# Patient Record
Sex: MALE | HISPANIC OR LATINO | Employment: OTHER | URBAN - METROPOLITAN AREA
[De-identification: names, ages, dates, MRNs, and addresses within clinical notes are randomized per-mention and may not be internally consistent; named-entity substitution may affect disease eponyms.]

---

## 2018-03-30 ENCOUNTER — HOSPITAL ENCOUNTER (OUTPATIENT)
Dept: RADIOLOGY | Facility: MEDICAL CENTER | Age: 83
End: 2018-03-30
Attending: FAMILY MEDICINE
Payer: MEDICARE

## 2018-03-30 ENCOUNTER — OFFICE VISIT (OUTPATIENT)
Dept: MEDICAL GROUP | Facility: MEDICAL CENTER | Age: 83
End: 2018-03-30
Payer: MEDICARE

## 2018-03-30 VITALS
TEMPERATURE: 96.8 F | WEIGHT: 153 LBS | OXYGEN SATURATION: 96 % | HEART RATE: 72 BPM | BODY MASS INDEX: 24.59 KG/M2 | DIASTOLIC BLOOD PRESSURE: 60 MMHG | SYSTOLIC BLOOD PRESSURE: 120 MMHG | HEIGHT: 66 IN | RESPIRATION RATE: 16 BRPM

## 2018-03-30 DIAGNOSIS — R68.89 FLU-LIKE SYMPTOMS: ICD-10-CM

## 2018-03-30 DIAGNOSIS — J18.9 PNEUMONIA OF LEFT LOWER LOBE DUE TO INFECTIOUS ORGANISM: ICD-10-CM

## 2018-03-30 LAB
FLUAV+FLUBV AG SPEC QL IA: NEGATIVE
INT CON NEG: NEGATIVE
INT CON POS: POSITIVE

## 2018-03-30 PROCEDURE — 87804 INFLUENZA ASSAY W/OPTIC: CPT | Performed by: FAMILY MEDICINE

## 2018-03-30 PROCEDURE — 71046 X-RAY EXAM CHEST 2 VIEWS: CPT

## 2018-03-30 PROCEDURE — 99214 OFFICE O/P EST MOD 30 MIN: CPT | Performed by: FAMILY MEDICINE

## 2018-03-30 RX ORDER — BENZONATATE 100 MG/1
100 CAPSULE ORAL 3 TIMES DAILY PRN
Qty: 60 CAP | Refills: 0 | Status: SHIPPED | OUTPATIENT
Start: 2018-03-30 | End: 2022-01-01

## 2018-03-30 RX ORDER — LEVOFLOXACIN 750 MG/1
750 TABLET, FILM COATED ORAL DAILY
Qty: 5 TAB | Refills: 0 | Status: SHIPPED | OUTPATIENT
Start: 2018-03-30 | End: 2018-04-04

## 2018-03-30 RX ORDER — ALBUTEROL SULFATE 90 UG/1
2 AEROSOL, METERED RESPIRATORY (INHALATION) EVERY 6 HOURS PRN
Qty: 8.5 G | Refills: 0 | Status: SHIPPED | OUTPATIENT
Start: 2018-03-30 | End: 2022-01-01

## 2018-03-30 RX ORDER — PREDNISONE 20 MG/1
20 TABLET ORAL DAILY
Qty: 5 TAB | Refills: 0 | Status: SHIPPED | OUTPATIENT
Start: 2018-03-30 | End: 2018-04-04

## 2018-03-30 NOTE — PROGRESS NOTES
cc: Cough    Subjective:     Jesse Conway is a 82 y.o. male presenting with his daughter is interpreting into [employer]. They reported that he developed a cough approximately one week ago. Associated with body aches, chills, subjective fevers, sore throat, runny nose, mild abdominal pain, shortness of breath, poor appetite. He had similar symptoms about 3 weeks ago, but symptoms initially resolved for about a week before his current episode started. He has been feeling incredibly weak and tired. He is currently using a cane, and has never needed one in the past. He reports falling twice in the shower due to weakness. He hasn't staying well-hydrated. Denies any nausea, vomiting, ear pain, diarrhea, rashes, recent travel, sick contacts. He has not had a sleep shot today. He has an allergy to penicillin, developed hives and shortness of breath.      Review of systems:  See above.        Current Outpatient Prescriptions:   •  levoFLOXacin (LEVAQUIN) 750 MG tablet, Take 1 Tab by mouth every day for 5 days., Disp: 5 Tab, Rfl: 0  •  predniSONE (DELTASONE) 20 MG Tab, Take 1 Tab by mouth every day for 5 days., Disp: 5 Tab, Rfl: 0  •  albuterol 108 (90 Base) MCG/ACT Aero Soln inhalation aerosol, Inhale 2 Puffs by mouth every 6 hours as needed for Shortness of Breath., Disp: 8.5 g, Rfl: 0  •  benzonatate (TESSALON) 100 MG Cap, Take 1 Cap by mouth 3 times a day as needed for Cough., Disp: 60 Cap, Rfl: 0    Allergies   Allergen Reactions   • Pcn [Penicillins]        Past Medical History:   Diagnosis Date   • Arthritis    • CATARACT    • Depression 8/3/2012   • DJD (degenerative joint disease) 4/22/2010   • GERD (gastroesophageal reflux disease) 8/3/2012   • H. pylori infection 8/3/2012   • Heart attack 2001   • Hypertension    • Impaired fasting glucose 8/3/2012     Past Surgical History:   Procedure Laterality Date   • CATARACT EXTRACTION WITH IOL       Family History   Problem Relation Age of Onset   • Diabetes Mother   "  • Hyperlipidemia       hyperlipidemia   • Hyperlipidemia Mother      Social History     Social History   • Marital status:      Spouse name: N/A   • Number of children: N/A   • Years of education: N/A     Occupational History   • Not on file.     Social History Main Topics   • Smoking status: Former Smoker     Quit date: 7/20/2012   • Smokeless tobacco: Never Used   • Alcohol use Yes      Comment: occasionally   • Drug use: No   • Sexual activity: Yes     Partners: Female      Comment:      Other Topics Concern   • Not on file     Social History Narrative   • No narrative on file       Objective:     Vitals: /60   Pulse 72   Temp 36 °C (96.8 °F)   Resp 16   Ht 1.676 m (5' 6\")   Wt 69.4 kg (153 lb)   SpO2 96%   BMI 24.69 kg/m²   General: Alert, pleasant, NAD, fatigued  HEENT: Normocephalic.  PERRL, EOMI, no icterus or pallor.  Conjunctivae and lids normal. External ears normal.  Tympanic membranes pearly, opaque.  Nares patent, mucosa pink, drainage present.  Oropharynx non-erythematous, mucous membranes moist.  Neck supple.  No thyromegaly or masses palpated. No cervical or supraclavicular lymphadenopathy.  Heart: Regular rate and rhythm.  S1 and S2 normal.  No murmurs appreciated.  Respiratory: Normal respiratory effort.  Diffuse rales on the left.  Abdomen: Non-distended, soft  Skin: Warm, dry, no rashes.  Extremities: No leg edema  Psych:  Affect/mood is normal, judgement is good, memory is intact, grooming is appropriate.    poc influenza: negative    Assessment/Plan:     Jesse was seen today for extremity weakness.    Diagnoses and all orders for this visit:    Pneumonia of left lower lobe due to infectious organism (CMS-[facility])  Flu-like symptoms  Discussed that he has pneumonia given his symptoms and exam. We'll start treatment with Levaquin, prednisone, albuterol and Tessalon if needed. X-ray ordered to confirm.  Discussed reasons to go to the ER, advised a very low threshold for " taking him to the ER given his weakness. Advised plenty of rest, fluids, Tylenol if needed. Recommended that he follow-up in a couple weeks  -     DX-CHEST-2 VIEWS; Future  -     POCT Influenza A/B  -     levoFLOXacin (LEVAQUIN) 750 MG tablet; Take 1 Tab by mouth every day for 5 days.  -     predniSONE (DELTASONE) 20 MG Tab; Take 1 Tab by mouth every day for 5 days.  -     albuterol 108 (90 Base) MCG/ACT Aero Soln inhalation aerosol; Inhale 2 Puffs by mouth every 6 hours as needed for Shortness of Breath.  -     benzonatate (TESSALON) 100 MG Cap; Take 1 Cap by mouth 3 times a day as needed for Cough.        Return in about 4 weeks (around 4/27/2018) for routine follow up.

## 2019-02-12 ENCOUNTER — OFFICE VISIT (OUTPATIENT)
Dept: INTERNAL MEDICINE | Facility: MEDICAL CENTER | Age: 84
End: 2019-02-12
Payer: MEDICARE

## 2019-02-12 VITALS
SYSTOLIC BLOOD PRESSURE: 132 MMHG | HEIGHT: 63 IN | OXYGEN SATURATION: 96 % | TEMPERATURE: 97.8 F | WEIGHT: 167.25 LBS | DIASTOLIC BLOOD PRESSURE: 52 MMHG | BODY MASS INDEX: 29.63 KG/M2 | HEART RATE: 67 BPM

## 2019-02-12 DIAGNOSIS — F32.9 MAJOR DEPRESSIVE DISORDER, REMISSION STATUS UNSPECIFIED, UNSPECIFIED WHETHER RECURRENT: ICD-10-CM

## 2019-02-12 DIAGNOSIS — Z86.69 HISTORY OF ITCHING OF EYE: ICD-10-CM

## 2019-02-12 DIAGNOSIS — I25.119 CORONARY ARTERY DISEASE WITH ANGINA PECTORIS, UNSPECIFIED VESSEL OR LESION TYPE, UNSPECIFIED WHETHER NATIVE OR TRANSPLANTED HEART (HCC): ICD-10-CM

## 2019-02-12 DIAGNOSIS — M46.92 CERVICAL SPONDYLITIS (HCC): ICD-10-CM

## 2019-02-12 DIAGNOSIS — R73.01 IFG (IMPAIRED FASTING GLUCOSE): ICD-10-CM

## 2019-02-12 DIAGNOSIS — H57.9 ITCHY, WATERY, AND RED EYE: ICD-10-CM

## 2019-02-12 DIAGNOSIS — Z91.89 AT RISK FOR PROLONGED QT INTERVAL SYNDROME: ICD-10-CM

## 2019-02-12 DIAGNOSIS — F45.8 OTHER SOMATOFORM DISORDERS: ICD-10-CM

## 2019-02-12 DIAGNOSIS — M79.601 RIGHT ARM PAIN: ICD-10-CM

## 2019-02-12 PROCEDURE — 99204 OFFICE O/P NEW MOD 45 MIN: CPT | Mod: GC | Performed by: INTERNAL MEDICINE

## 2019-02-12 PROCEDURE — 99999 PR NO CHARGE: CPT | Performed by: INTERNAL MEDICINE

## 2019-02-12 PROCEDURE — 93000 ELECTROCARDIOGRAM COMPLETE: CPT | Mod: GC | Performed by: INTERNAL MEDICINE

## 2019-02-12 ASSESSMENT — PATIENT HEALTH QUESTIONNAIRE - PHQ9: CLINICAL INTERPRETATION OF PHQ2 SCORE: 0

## 2019-02-12 NOTE — PATIENT INSTRUCTIONS
Eat healthy diet, avoid stresses, do regular exercise program  Get labs and x rays  Once those come back we will decide what to do for your right arm and mood  See the eye doctor. In the mean time apply warm compresses to your eyes and use Refresh preservative free

## 2019-02-12 NOTE — LETTER
[facility]  [facility]  [address]  [place] [address]  Fax: [phone]   Authorization for Release/Disclosure of   Protected Health Information   Name: JESSE LOFTON : 1935 SSN: [id number]   Address: [address]  [place] [address] Phone:    [phone] (home)    I authorize the entity listed below to release/disclose the PHI below to:   [facility]YSSA [facility] and JOCELYN [facility]nes[facility].   Provider or Entity Name:Dr Curtis      [facility], [facility]   Phone:[phone]      Fax:     Reason for request: continuity of care   Information to be released:    [  ] LAST COLONOSCOPY,  including any PATH REPORT and follow-up  [  ] LAST FIT/COLOGUARD RESULT [  ] LAST DEXA  [  ] LAST MAMMOGRAM  [  ] LAST PAP  [  ] LAST LABS [  ] RETINA EXAM REPORT  [  ] IMMUNIZATION RECORDS  [ X ] Release all info      [  ] Check here and initial the line next to each item to release ALL health information INCLUDING  _____ Care and treatment for drug and / or alcohol abuse  _____ HIV testing, infection status, or AIDS  _____ Genetic Testing    DATES OF SERVICE OR TIME PERIOD TO BE DISCLOSED: _____________  I understand and acknowledge that:  * This Authorization may be revoked at any time by you in writing, except if your health information has already been used or disclosed.  * Your health information that will be used or disclosed as a result of you signing this authorization could be re-disclosed by the recipient. If this occurs, your re-disclosed health information may no longer be protected by State or Federal laws.  * You may refuse to sign this Authorization. Your refusal will not affect your ability to obtain treatment.  * This Authorization becomes effective upon signing and will  on (date) __________.      If no date is indicated, this Authorization will  one (1) year from the signature date.    Name: Jesse Lofton    Signature:   Date:     2/12/2019       PLEASE FAX REQUESTED RECORDS BACK TO: [phone]

## 2019-02-13 NOTE — PROGRESS NOTES
New Patient to Establish    Reason to establish: Establish care with PCP    Chief Complaint   Patient presents with   • New Patient   • Fatigue     Weak   • Arm Pain     Right. Elbow down to finger tips.    • Eye Problem     Itching/red/dry        History has taken from the patient (interpretation service), his daughter and medical records    HPI:  83 year old male with PMH of CVA, cataract, CAD. He come today to establish care with PCP. The patient come today with his daughter (Kellen Ventura). He does not speak english so we use interpretation services ([place], [id number]).     On today's visit he is c/o b/l eye itching that stated right after his cataract surgery 5 year ago with on and off course. Itching is usually associated with mild watery discharge and red eye. The patient denies hx of allergic conjunctivitis or any other atopic conditions. However, he reports hx of allergy to penecillin    He also c/o right arm pain that extend from right elbow to the 4th and 5th fingers. The pain is about 9-10/10, aching, constant pain, not related to recent trauma and not associated with any weakness or  sensary loss. The patient couldn't estimate when the pain start but described it as long term pain.    The patient also report low mood most of times, less interest in activities he used to do and feeling tired all the time. He also reports sleeping much longer than he used to before. He denies PMH of similar condition as well hx of thyroid diseases. However, he denies any thought of harming himself or others.    The patient reports PMH of CAD. In 2000, the patient had chest pain for which he went to hospital he received medical management but did not receive LHC or further intervention. Since then he [place] not similar condition. He also report hx of a couple of high BP readings but he never being on any BP medication    The patient lives with his family in his home with his wife, 4 children and grandchildren. He received some  primary school education. He is retired. He is independent in all his ADLs like bathing, eating as well in most of his IADLs however, he get help in managing his financials, shopping and paying his bills. He stopped driving long time ago as his driving licensing has withdrawn from him because of Driving Under Alcohol Influence.    On today's visit the patient denies chest pain, palpitations, dizziness fever, cough, SOB, headaches, constipation, diarrhea, insistency urgency or bunring mitiuration. However he report mild low stable back pain.       FH:   The patient denies any family  Hx of atopic condition like allergic conjunctivitis or allergic rhinitis    SH:  The patient smoked cigarettes  For about 50 year but quit in 2004  His driving license has withdrown due to Driving Under Alcohol Influence.  The patient daughter reports that the patient was a heavy alcohol drinker but stated to cutdown after 2004 but does have 4 drinks or more in last year a couple of times.  Retired           THREE CHRONIC CONDITIONS  1- Coronary artery disease, stable  2- Osteoarthritis, stable  3- Allergic conjuctivitis, stable      Past Medical History:   Diagnosis Date   • Arthritis    • CATARACT    • Depression 8/3/2012   • DJD (degenerative joint disease) 4/22/2010   • GERD (gastroesophageal reflux disease) 8/3/2012   • H. pylori infection 8/3/2012   • Heart attack (HCC) 2001   • Hypertension    • Impaired fasting glucose 8/3/2012       Past Surgical History:   Procedure Laterality Date   • CATARACT EXTRACTION WITH IOL         Current Outpatient Prescriptions   Medication Sig Dispense Refill   • albuterol 108 (90 Base) MCG/ACT Aero Soln inhalation aerosol Inhale 2 Puffs by mouth every 6 hours as needed for Shortness of Breath. (Patient not taking: Reported on 2/12/2019) 8.5 g 0   • benzonatate (TESSALON) 100 MG Cap Take 1 Cap by mouth 3 times a day as needed for Cough. (Patient not taking: Reported on 2/12/2019) 60 Cap 0     No  "current facility-administered medications for this visit.        Allergies as of 02/12/2019 - Reviewed 02/12/2019   Allergen Reaction Noted   • Pcn [penicillins] Rash and Nausea 04/22/2010       Social History     Social History   • Marital status:      Spouse name: N/A   • Number of children: N/A   • Years of education: N/A     Occupational History   • Not on file.     Social History Main Topics   • Smoking status: Former Smoker     Packs/day: 0.50     Years: 50.00     Types: Cigarettes     Quit date: 01/2004   • Smokeless tobacco: Never Used   • Alcohol use Yes      Comment: occasionally.    • Drug use: No   • Sexual activity: Yes     Partners: Female      Comment:      Other Topics Concern   • Not on file     Social History Narrative   • No narrative on file       Family History   Problem Relation Age of Onset   • Diabetes Mother    • Hyperlipidemia Mother    • Hyperlipidemia Unknown         hyperlipidemia       ROS:   A complete 14-system review of systems was obtained and is otherwise negative except as stated in the history of present illness, below, and/or the pre-visit questionnaire.        /52 (BP Location: Right arm, Patient Position: Sitting, BP Cuff Size: Adult)   Pulse 67   Temp 36.6 °C (97.8 °F) (Temporal)   Ht 1.607 m (5' 3.25\")   Wt 75.9 kg (167 lb 4 oz)   SpO2 96%   BMI 29.39 kg/m²     Physical Exam  General:  Alert and oriented.  No apparent distress.  Eyes: Pupils equal and reactive to light. No scleral icterus.O/E: mild b/l eye redness, mild white to yellow discharge and significant lose of eye lashes especially in the both lower eyelids  Ears:  Ear canals patent. Tympanic membranes visualized.  ENMT: Moist mucous membranes. Oropharynx clear. No erythema or exudates noted.  Neck: Supple. Trachea midline.  Resp: Clear to auscultation bilaterally. No rales, rhonchi, or wheezes.  Cardiovascular: Regular rate and normal rhythm. No murmurs, rubs or gallops. 2+ radial and " dorsalis pedis pulses.   Abdomen: Soft, nontender, nondistended. Positive bowel sounds.   Musculoskeletal: No clubbing, cyanosis, edema.  pain in the right upper limb following ulnar nerve distribution. There is not swelling, loss of power or sensation.  Skin: Clear. No rash noted. Onychomycosis on lower limb nails   Lymph: No cervical lymphadenopathy appreciated.  Neuro: Cranial nerves II through XII intact.  Psych:  Mood most of times, less interest in activities he used to do and feeling tired all the time. He also reports sleeping more than he used to before      Assessment and Plan  # Evaluation for allergic conjunctivitis:  c/o b/l eye itching, stated right after cataract surgery 5 year ago,  associated with mild watery discharge and red eye, hx of allergy to penicillin. O/E: mild b/l eye redness, mild white to yellow discharge and significant lose of eye lashes especially in the both lower eyelids. We recommend the patient to use sterile Refresh preservative free eye drops and hot compresses. We also recommend a referral to opthalmology for further evaluation.    # Right arm pain for evaluation/Cervical spondylitis  C/O: right arm pain that extend from right elbow to the 4th and 5th fingers. 9-10/10, aching, constant pain, no recent trauma and not associated with any weakness or loss of sensation. O/E: the pain is following ulnar nerve distribution. There is not swelling, loss of power or sensation. 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 Pt's right arm pain likely related to cervical degenerative disease. We recommend cervical XRay to evaluate for disease progression as well right elbow x ray to exclude ulnar nerve compression. We'll continue to monitor    # Evaluation for Major Depression Disorder   C/o low mood most of times,  less interest in activities he used to do, feeling tired all the time., sleeping more than he used to before. We perform PHQ-9 today which showed significant 16/27 (moderate to sever depression). We recommend TSH, B12 level to exclude other causes of depression. We consider alcohol dependency as a possibly participating factor. We also consider starting the patient on SSRI. However, QTc today is 469. We will discuss risks and benefits with patient in the next visit    # Prolonged QT interval syndrome  QTc today is 469 ms and  signs of first degree heart block (2/12). Last EKC showed QTc 470 ms (7/5/2012). The patient denies chest pain, palpitations, syncope or pre-syncope. We counseled the patient to report long QTc before getting any new medication. We will continue to monitor    # Coronary artery disease:  The patient reports PMH of CAD. In 2000, the patient had chest pain for which he went to hospital he received medical management but did not receive LHC or further intervention. EKG today show relative bradycardia HR(57) with signs of first degree heart block. However, the patient denies chest pain or new exertional SOB. The patient has multiple risk factors for CAD, age, gender, previous Hx of smoking. The patient EKG changes likely related of Hx of CAD. We may consider stress test to exclude the need for intervention in the next visit    # Evaluation for alcohol dependency:  Driving license has withdrown due to Driving Under Alcohol Influence.. The patient daughter reports that the patient was a heavy alcohol drinker but stated to cutdown after 2004 but does have 4 drinks or more in last year at least one time. AST was 46, AST was 23 (8/2015), almost 2:1 ratio which indicate ongoing alcohol related liver disease. We counseled the patient on cutting down on drinking as it likey participating in low mood, EKC changes and elevated liver enzymes. We continue to monitor and may consider Alcoholics Anonymous in the  next visit.    # Glucose intolerance/ Pre-diabetes:  The patient has PMH HbA1c is 6 in 8/2015. The patient did not receive any medication. Today he denies polydipsia, polyphagia or polyurea We recommend to check HbA1c to assess to disease progression. We will continue to monitor          Geriatrics comprehensive assessment:  - Mood: reports  low mood  PHQ-9 today which showed significant 16/27 (moderate to sever depression). We recommend TSH, B12 level to exclude other causes of depression    - Functional level:  The patient independent in all his ADLs. He gets some help for IADLs like shopping and paying bills. Ambulate without assistance but use a      - Falls:  The patient reports 2 falls in the past year, last fall was in the summer of 2018 that was in the home backyard when the patient watering his plants.    - Memory:  Denies memory difficulties    - Hearing and vision:  Denies any hearing or vision issues    - Advance care planning and AD  The patient has not AD. We will consider open AD discussion next visit            Patient Instructions   Eat healthy diet, avoid stresses, do regular exercise program  Get labs and x rays  Once those come back we will decide what to do for your right arm and mood  See the eye doctor. In the mean time apply warm compresses to your eyes and use Refresh preservative free      Follow-up  Return in about 2 weeks (around 2/26/2019) for Long.    Signed by: Milena Savage M.D.

## 2019-03-09 LAB
ALBUMIN SERPL-MCNC: 3.9 G/DL (ref 3.5–4.7)
ALBUMIN/GLOB SERPL: 1.2 {RATIO} (ref 1.2–2.2)
ALP SERPL-CCNC: 84 IU/L (ref 39–117)
ALT SERPL-CCNC: 23 IU/L (ref 0–44)
AST SERPL-CCNC: 50 IU/L (ref 0–40)
BASOPHILS # BLD AUTO: 0.1 X10E3/UL (ref 0–0.2)
BASOPHILS NFR BLD AUTO: 1 %
BILIRUB SERPL-MCNC: 2.1 MG/DL (ref 0–1.2)
BUN SERPL-MCNC: 8 MG/DL (ref 8–27)
BUN/CREAT SERPL: 11 (ref 10–24)
CALCIUM SERPL-MCNC: 9.3 MG/DL (ref 8.6–10.2)
CHLORIDE SERPL-SCNC: 106 MMOL/L (ref 96–106)
CO2 SERPL-SCNC: 18 MMOL/L (ref 20–29)
CREAT SERPL-MCNC: 0.75 MG/DL (ref 0.76–1.27)
EOSINOPHIL # BLD AUTO: 0.2 X10E3/UL (ref 0–0.4)
EOSINOPHIL NFR BLD AUTO: 4 %
ERYTHROCYTE [DISTWIDTH] IN BLOOD BY AUTOMATED COUNT: 14.8 % (ref 12.3–15.4)
GLOBULIN SER CALC-MCNC: 3.2 G/DL (ref 1.5–4.5)
GLUCOSE SERPL-MCNC: 110 MG/DL (ref 65–99)
HCT VFR BLD AUTO: 47 % (ref 37.5–51)
HGB BLD-MCNC: 16.4 G/DL (ref 13–17.7)
IMM GRANULOCYTES # BLD AUTO: 0 X10E3/UL (ref 0–0.1)
IMM GRANULOCYTES NFR BLD AUTO: 0 %
IMMATURE CELLS  115398: ABNORMAL
LYMPHOCYTES # BLD AUTO: 2.3 X10E3/UL (ref 0.7–3.1)
LYMPHOCYTES NFR BLD AUTO: 34 %
MCH RBC QN AUTO: 33.1 PG (ref 26.6–33)
MCHC RBC AUTO-ENTMCNC: 34.9 G/DL (ref 31.5–35.7)
MCV RBC AUTO: 95 FL (ref 79–97)
MONOCYTES # BLD AUTO: 0.8 X10E3/UL (ref 0.1–0.9)
MONOCYTES NFR BLD AUTO: 11 %
MORPHOLOGY BLD-IMP: ABNORMAL
NEUTROPHILS # BLD AUTO: 3.5 X10E3/UL (ref 1.4–7)
NEUTROPHILS NFR BLD AUTO: 50 %
NRBC BLD AUTO-RTO: ABNORMAL %
PLATELET # BLD AUTO: 114 X10E3/UL (ref 150–379)
POTASSIUM SERPL-SCNC: 3.8 MMOL/L (ref 3.5–5.2)
PROT SERPL-MCNC: 7.1 G/DL (ref 6–8.5)
RBC # BLD AUTO: 4.96 X10E6/UL (ref 4.14–5.8)
SODIUM SERPL-SCNC: 139 MMOL/L (ref 134–144)
TSH SERPL DL<=0.005 MIU/L-ACNC: 3.12 UIU/ML (ref 0.45–4.5)
VIT B12 SERPL-MCNC: 608 PG/ML (ref 232–1245)
WBC # BLD AUTO: 6.9 X10E3/UL (ref 3.4–10.8)

## 2019-07-23 ENCOUNTER — OFFICE VISIT (OUTPATIENT)
Dept: MEDICAL GROUP | Facility: MEDICAL CENTER | Age: 84
End: 2019-07-23
Payer: MEDICARE

## 2019-07-23 VITALS
BODY MASS INDEX: 29.77 KG/M2 | HEART RATE: 87 BPM | HEIGHT: 63 IN | WEIGHT: 168 LBS | SYSTOLIC BLOOD PRESSURE: 148 MMHG | OXYGEN SATURATION: 94 % | TEMPERATURE: 99.6 F | DIASTOLIC BLOOD PRESSURE: 70 MMHG | RESPIRATION RATE: 16 BRPM

## 2019-07-23 DIAGNOSIS — R06.02 SOB (SHORTNESS OF BREATH) ON EXERTION: ICD-10-CM

## 2019-07-23 DIAGNOSIS — F33.1 MODERATE EPISODE OF RECURRENT MAJOR DEPRESSIVE DISORDER (HCC): ICD-10-CM

## 2019-07-23 DIAGNOSIS — R94.31 PROLONGED Q-T INTERVAL ON ECG: ICD-10-CM

## 2019-07-23 DIAGNOSIS — M79.89 LEG SWELLING: ICD-10-CM

## 2019-07-23 DIAGNOSIS — I25.10 CORONARY ARTERY DISEASE INVOLVING NATIVE HEART WITHOUT ANGINA PECTORIS, UNSPECIFIED VESSEL OR LESION TYPE: ICD-10-CM

## 2019-07-23 DIAGNOSIS — R53.83 TIREDNESS: ICD-10-CM

## 2019-07-23 PROCEDURE — 99204 OFFICE O/P NEW MOD 45 MIN: CPT | Performed by: FAMILY MEDICINE

## 2019-07-23 ASSESSMENT — PATIENT HEALTH QUESTIONNAIRE - PHQ9
1. LITTLE INTEREST OR PLEASURE IN DOING THINGS: 2
4. FEELING TIRED OR HAVING LITTLE ENERGY: 2
8. MOVING OR SPEAKING SO SLOWLY THAT OTHER PEOPLE COULD HAVE NOTICED. OR THE OPPOSITE, BEING SO FIGETY OR RESTLESS THAT YOU HAVE BEEN MOVING AROUND A LOT MORE THAN USUAL: 2
7. TROUBLE CONCENTRATING ON THINGS, SUCH AS READING THE NEWSPAPER OR WATCHING TELEVISION: 1
5. POOR APPETITE OR OVEREATING: 1
9. THOUGHTS THAT YOU WOULD BE BETTER OFF DEAD, OR OF HURTING YOURSELF: 0
SUM OF ALL RESPONSES TO PHQ9 QUESTIONS 1 AND 2: 4
2. FEELING DOWN, DEPRESSED, IRRITABLE, OR HOPELESS: 2
3. TROUBLE FALLING OR STAYING ASLEEP OR SLEEPING TOO MUCH: 1
6. FEELING BAD ABOUT YOURSELF - OR THAT YOU ARE A FAILURE OR HAVE LET YOURSELF OR YOUR FAMILY DOWN: 2
SUM OF ALL RESPONSES TO PHQ QUESTIONS 1-9: 13

## 2019-07-23 NOTE — PROGRESS NOTES
CC: New patient: Depression, prolonged QT interval, CAD, shortness of breath/leg swelling/tiredness    HPI:  Jesse presents today to establish new PCP.    Patient is Urdu-speaking came in today with his daughter to establish new PCP.  Had the following medical problems:    Moderate episode of recurrent major depressive disorder (HCC)  Patient has been feeling weak and depressed.  PHQ score is 13.  Patient used to take antidepressant, but he ran out.  Was seen by a previous PCP and was found that he has prolonged QT interval and advised not to take the antidepressant at that time.  However he denies any suicidal ideation.    Prolonged Q-T interval on ECG  Patient has been asymptomatic.  12 accidental finding EKG.    Coronary artery disease involving native heart without angina pectoris, unspecified vessel or lesion type  Patient has history of myocardial infarction(in [place]), in 2004.  Has been asymptomatic for many years.  But lately has been having some shortness of breath or leg swelling.  However currently denies any chest pain.    Leg swelling/shortness of breath/tiredness  He has history of MI, currently denies leg swelling and shortness of breath with exertion, also he has been feeling tired all the time.  No chest pain.  No echocardiogram in record.  Denies any dizziness, nausea, vomiting.    Will discuss vaccinations and other health maintenance issues next visit in 2 weeks    Patient Active Problem List    Diagnosis Date Noted   • Moderate episode of recurrent major depressive disorder (HCC) 07/23/2019   • Prolonged Q-T interval on ECG 07/23/2019   • Coronary artery disease involving native heart without angina pectoris 07/23/2019   • Leg swelling 07/23/2019       Current Outpatient Prescriptions   Medication Sig Dispense Refill   • albuterol 108 (90 Base) MCG/ACT Aero Soln inhalation aerosol Inhale 2 Puffs by mouth every 6 hours as needed for Shortness of Breath. (Patient not taking: Reported on  "2/12/2019) 8.5 g 0   • benzonatate (TESSALON) 100 MG Cap Take 1 Cap by mouth 3 times a day as needed for Cough. (Patient not taking: Reported on 2/12/2019) 60 Cap 0     No current facility-administered medications for this visit.          Allergies as of 07/23/2019 - Reviewed 02/14/2019   Allergen Reaction Noted   • Pcn [penicillins] Rash and Nausea 04/22/2010        Social History     Social History   • Marital status:      Spouse name: N/A   • Number of children: N/A   • Years of education: N/A     Occupational History   • Not on file.     Social History Main Topics   • Smoking status: Former Smoker     Packs/day: 0.50     Years: 50.00     Types: Cigarettes     Quit date: 01/2004   • Smokeless tobacco: Never Used   • Alcohol use Yes      Comment: occasionally.    • Drug use: No   • Sexual activity: Yes     Partners: Female      Comment:      Other Topics Concern   • Not on file     Social History Narrative   • No narrative on file       Family History   Problem Relation Age of Onset   • Diabetes Mother    • Hyperlipidemia Mother    • Hyperlipidemia Unknown         hyperlipidemia       Past Surgical History:   Procedure Laterality Date   • CATARACT EXTRACTION WITH IOL         ROS:  Denies any Headache, Blurred Vision, Confusion Chest pain,  Shortness of breath,  Abdominal pain, Changes of bowel or bladder, Lower ext edema, Fevers, Nights sweats, Weight Changes, Focal weakness or numbness.  All other systems are negative.    /70 (BP Location: Right arm, Patient Position: Sitting, BP Cuff Size: Adult)   Pulse 87   Temp 37.6 °C (99.6 °F) (Temporal)   Resp 16   Ht 1.6 m (5' 3\")   Wt 76.2 kg (168 lb)   SpO2 94%   BMI 29.76 kg/m²     Physical Exam:  Gen:         Alert and oriented, No apparent distress.  HEENT:   P[place]la, TM clear,  Oralpharynx no erythema or exudates.  Neck:       No Jugular venous distension, Lymphadenopathy, Thyromegaly, Bruits.  Lungs:     Clear to auscultation " bilaterally  CV:          Regular rate and rhythm. No murmurs, rubs or gallops.  Abd:         Soft non tender, non distended. Normal active bowel sounds. No                                        Hepatosplenomegaly, No pulsatile masses.  Ext:          No clubbing, cyanosis, edema.    Assessment and Plan.   84 y.o. male     1. Moderate episode of recurrent major depressive disorder (HCC)  Patient used to take antidepressant, but he ran out.   At this moment I recommend not to take the antidepressant, previous EKG showed prolonged QT/QTc interval.    - TSH+FREE T4    2. Prolonged Q-T interval on ECG  Asymptomatic.  Patient advised to avoid medications that makes the problem worse to avoid cardiac arrhythmia    3. Coronary artery disease involving native heart without angina pectoris, unspecified vessel or lesion type  History of myocardial infarction(in [place]), in 2004.  Has been asymptomatic for many years.  But lately has been having some shortness of breath or leg swelling.  We will do echocardiogram.    - EC-ECHOCARDIOGRAM COMPLETE W/ CONT; Future  - CBC WITH DIFFERENTIAL; Future  - Comp Metabolic Panel; Future  - Lipid Profile; Future    4. Leg swelling  History of MI, currently denies leg swelling and shortness of breath with exertion.    - EC-ECHOCARDIOGRAM COMPLETE W/ CONT; Future    5. SOB (shortness of breath) on exertion    - EC-ECHOCARDIOGRAM COMPLETE W/ CONT; Future  - CBC WITH DIFFERENTIAL; Future    6. Tiredness  Rule out:  CHF: Patient has history of MI, currently has shortness of breath with exertion and bilateral leg swelling.  Anemia: Shortness of breath with exertion  Hypo-/hyper thyroidism: We will check TSH/free T4  Of the reason:e.g kidney disease, will do CMP  Patient is advised to return back in 2 weeks for reevaluation after taking his blood work and echocardiogram.    - EC-ECHOCARDIOGRAM COMPLETE W/ CONT; Future  - CBC WITH DIFFERENTIAL; Future  - Comp Metabolic Panel; Future  - TSH+FREE  T4

## 2019-08-09 LAB
ALBUMIN SERPL-MCNC: 3.6 G/DL (ref 3.5–4.7)
ALBUMIN/GLOB SERPL: 1.2 {RATIO} (ref 1.2–2.2)
ALP SERPL-CCNC: 92 IU/L (ref 39–117)
ALT SERPL-CCNC: 18 IU/L (ref 0–44)
AST SERPL-CCNC: 47 IU/L (ref 0–40)
BASOPHILS # BLD AUTO: 0.1 X10E3/UL (ref 0–0.2)
BASOPHILS NFR BLD AUTO: 1 %
BILIRUB SERPL-MCNC: 1.5 MG/DL (ref 0–1.2)
BUN SERPL-MCNC: 8 MG/DL (ref 8–27)
BUN/CREAT SERPL: 12 (ref 10–24)
CALCIUM SERPL-MCNC: 8.6 MG/DL (ref 8.6–10.2)
CHLORIDE SERPL-SCNC: 108 MMOL/L (ref 96–106)
CHOLEST SERPL-MCNC: 133 MG/DL (ref 100–199)
CO2 SERPL-SCNC: 18 MMOL/L (ref 20–29)
CREAT SERPL-MCNC: 0.69 MG/DL (ref 0.76–1.27)
EOSINOPHIL # BLD AUTO: 0.2 X10E3/UL (ref 0–0.4)
EOSINOPHIL NFR BLD AUTO: 4 %
ERYTHROCYTE [DISTWIDTH] IN BLOOD BY AUTOMATED COUNT: 14.3 % (ref 12.3–15.4)
GLOBULIN SER CALC-MCNC: 2.9 G/DL (ref 1.5–4.5)
GLUCOSE SERPL-MCNC: 127 MG/DL (ref 65–99)
HCT VFR BLD AUTO: 40.5 % (ref 37.5–51)
HDLC SERPL-MCNC: 52 MG/DL
HGB BLD-MCNC: 14.4 G/DL (ref 13–17.7)
IMM GRANULOCYTES # BLD AUTO: 0 X10E3/UL (ref 0–0.1)
IMM GRANULOCYTES NFR BLD AUTO: 0 %
IMMATURE CELLS  115398: ABNORMAL
LABORATORY COMMENT REPORT: NORMAL
LDLC SERPL CALC-MCNC: 55 MG/DL (ref 0–99)
LYMPHOCYTES # BLD AUTO: 2 X10E3/UL (ref 0.7–3.1)
LYMPHOCYTES NFR BLD AUTO: 37 %
MCH RBC QN AUTO: 33.6 PG (ref 26.6–33)
MCHC RBC AUTO-ENTMCNC: 35.6 G/DL (ref 31.5–35.7)
MCV RBC AUTO: 95 FL (ref 79–97)
MONOCYTES # BLD AUTO: 0.6 X10E3/UL (ref 0.1–0.9)
MONOCYTES NFR BLD AUTO: 11 %
MORPHOLOGY BLD-IMP: ABNORMAL
NEUTROPHILS # BLD AUTO: 2.5 X10E3/UL (ref 1.4–7)
NEUTROPHILS NFR BLD AUTO: 47 %
NRBC BLD AUTO-RTO: ABNORMAL %
PLATELET # BLD AUTO: 112 X10E3/UL (ref 150–450)
POTASSIUM SERPL-SCNC: 3.9 MMOL/L (ref 3.5–5.2)
PROT SERPL-MCNC: 6.5 G/DL (ref 6–8.5)
RBC # BLD AUTO: 4.28 X10E6/UL (ref 4.14–5.8)
SODIUM SERPL-SCNC: 139 MMOL/L (ref 134–144)
TRIGL SERPL-MCNC: 129 MG/DL (ref 0–149)
VLDLC SERPL CALC-MCNC: 26 MG/DL (ref 5–40)
WBC # BLD AUTO: 5.4 X10E3/UL (ref 3.4–10.8)

## 2019-08-12 ENCOUNTER — HOSPITAL ENCOUNTER (OUTPATIENT)
Dept: CARDIOLOGY | Facility: MEDICAL CENTER | Age: 84
End: 2019-08-12
Attending: FAMILY MEDICINE
Payer: MEDICARE

## 2019-08-12 DIAGNOSIS — I25.10 CORONARY ARTERY DISEASE INVOLVING NATIVE HEART WITHOUT ANGINA PECTORIS, UNSPECIFIED VESSEL OR LESION TYPE: ICD-10-CM

## 2019-08-12 DIAGNOSIS — R53.83 TIREDNESS: ICD-10-CM

## 2019-08-12 DIAGNOSIS — R06.02 SOB (SHORTNESS OF BREATH) ON EXERTION: ICD-10-CM

## 2019-08-12 DIAGNOSIS — M79.89 LEG SWELLING: ICD-10-CM

## 2019-08-12 LAB
LV EJECT FRACT  99904: 70
LV EJECT FRACT MOD 2C 99903: 64.65
LV EJECT FRACT MOD 4C 99902: 74.68
LV EJECT FRACT MOD BP 99901: 72.8

## 2019-08-12 PROCEDURE — 93306 TTE W/DOPPLER COMPLETE: CPT | Mod: 26 | Performed by: INTERNAL MEDICINE

## 2019-08-12 PROCEDURE — 93306 TTE W/DOPPLER COMPLETE: CPT

## 2019-08-21 ENCOUNTER — OFFICE VISIT (OUTPATIENT)
Dept: MEDICAL GROUP | Facility: MEDICAL CENTER | Age: 84
End: 2019-08-21
Payer: MEDICARE

## 2019-08-21 VITALS
HEART RATE: 66 BPM | OXYGEN SATURATION: 94 % | WEIGHT: 166.23 LBS | SYSTOLIC BLOOD PRESSURE: 120 MMHG | HEIGHT: 63 IN | BODY MASS INDEX: 29.45 KG/M2 | DIASTOLIC BLOOD PRESSURE: 70 MMHG | TEMPERATURE: 98.6 F | RESPIRATION RATE: 16 BRPM

## 2019-08-21 DIAGNOSIS — M79.89 LEG SWELLING: ICD-10-CM

## 2019-08-21 PROCEDURE — 99214 OFFICE O/P EST MOD 30 MIN: CPT | Performed by: FAMILY MEDICINE

## 2019-08-21 RX ORDER — FUROSEMIDE 20 MG/1
20 TABLET ORAL 2 TIMES DAILY
Qty: 14 TAB | Refills: 0 | Status: SHIPPED | OUTPATIENT
Start: 2019-08-21 | End: 2019-08-28

## 2019-08-21 RX ORDER — POTASSIUM CHLORIDE 20 MEQ/1
20 TABLET, EXTENDED RELEASE ORAL DAILY
Qty: 7 TAB | Refills: 0 | Status: SHIPPED | OUTPATIENT
Start: 2019-08-21 | End: 2019-08-28

## 2019-08-21 NOTE — PROGRESS NOTES
"CC: Leg swelling, discuss the echocardiogram result    HPI:   Jesse presents today because he has been having bilateral leg swelling, and some shortness of breath with exertion, echocardiogram was done, showed:  Normal left ventricular size and systolic function.  Left ventricular ejection fraction is visually estimated to be 70%.  Normal diastolic function.  Normal right ventricular systolic function.  Mild mitral regurgitation.    Denies any chest pain, or palpitation.  No shortness of breath at rest, no paroxysmal nocturnal dyspnea.    Patient Active Problem List    Diagnosis Date Noted   • Moderate episode of recurrent major depressive disorder (HCC) 07/23/2019   • Prolonged Q-T interval on ECG 07/23/2019   • Coronary artery disease involving native heart without angina pectoris 07/23/2019   • Leg swelling 07/23/2019       Current Outpatient Medications   Medication Sig Dispense Refill   • CINNAMON PO Take  by mouth.     • furosemide (LASIX) 20 MG Tab Take 1 Tab by mouth 2 times a day for 7 days. 14 Tab 0   • potassium chloride SA (KDUR) 20 MEQ Tab CR Take 1 Tab by mouth every day for 7 days. 7 Tab 0   • albuterol 108 (90 Base) MCG/ACT Aero Soln inhalation aerosol Inhale 2 Puffs by mouth every 6 hours as needed for Shortness of Breath. (Patient not taking: Reported on 2/12/2019) 8.5 g 0   • benzonatate (TESSALON) 100 MG Cap Take 1 Cap by mouth 3 times a day as needed for Cough. (Patient not taking: Reported on 2/12/2019) 60 Cap 0     No current facility-administered medications for this visit.          Allergies as of 08/21/2019 - Reviewed 08/21/2019   Allergen Reaction Noted   • Pcn [penicillins] Rash and Nausea 04/22/2010        ROS: Denies any chest pain, Shortness of breath, Changes bowel or bladder, Lower extremity edema.    Physical Exam:  /70 (BP Location: Right arm, Patient Position: Sitting, BP Cuff Size: Adult)   Pulse 66   Temp 37 °C (98.6 °F) (Temporal)   Resp 16   Ht 1.6 m (5' 3\")   Wt " 75.4 kg (166 lb 3.6 oz)   SpO2 94%   BMI 29.45 kg/m²   Gen.: Well-developed, well-nourished, no apparent distress,pleasant and cooperative with the examination  Neck:  No JVD.  Cor: Regular rate and rhythm without murmur, gallop or rub.  Extremities: Leg edema 2+        Assessment and Plan.   84 y.o. male     1. Leg swelling  Echocardiogram showed no significant abnormality, is probably due to venous insufficiency.  Will start patient on furosemide 20 mg twice a day for a week.  Patient advised to use compression socks after that.    - furosemide (LASIX) 20 MG Tab; Take 1 Tab by mouth 2 times a day for 7 days.  Dispense: 14 Tab; Refill: 0  - potassium chloride SA (KDUR) 20 MEQ Tab CR; Take 1 Tab by mouth every day for 7 days.  Dispense: 7 Tab; Refill: 0

## 2019-09-23 ENCOUNTER — OFFICE VISIT (OUTPATIENT)
Dept: URGENT CARE | Facility: PHYSICIAN GROUP | Age: 84
End: 2019-09-23
Payer: MEDICARE

## 2019-09-23 VITALS
HEIGHT: 64 IN | DIASTOLIC BLOOD PRESSURE: 62 MMHG | SYSTOLIC BLOOD PRESSURE: 142 MMHG | WEIGHT: 164 LBS | HEART RATE: 87 BPM | RESPIRATION RATE: 16 BRPM | TEMPERATURE: 99.2 F | OXYGEN SATURATION: 95 % | BODY MASS INDEX: 28 KG/M2

## 2019-09-23 DIAGNOSIS — H10.32 ACUTE BACTERIAL CONJUNCTIVITIS OF LEFT EYE: ICD-10-CM

## 2019-09-23 PROCEDURE — 99214 OFFICE O/P EST MOD 30 MIN: CPT | Performed by: FAMILY MEDICINE

## 2019-09-23 RX ORDER — TOBRAMYCIN 3 MG/ML
1 SOLUTION/ DROPS OPHTHALMIC EVERY 4 HOURS
Qty: 1 BOTTLE | Refills: 0 | Status: SHIPPED | OUTPATIENT
Start: 2019-09-23 | End: 2022-01-01

## 2019-09-24 NOTE — PROGRESS NOTES
Subjective:     Jesse Conway is a 84 y.o. male who presents for Eye Problem (Slight Drainage/ Itchy/ Vision Problem/Something inside x 2 days)       Eye Problem    The left eye is affected. This is a new problem. The current episode started in the past 7 days. The problem occurs constantly. The problem has been gradually worsening. There was no injury mechanism. The pain is moderate. Associated symptoms include an eye discharge, eye redness and itching. Pertinent negatives include no fever, foreign body sensation, nausea or vomiting.     Past Medical History:   Diagnosis Date   • Arthritis    • CATARACT    • Depression 8/3/2012   • DJD (degenerative joint disease) 4/22/2010   • GERD (gastroesophageal reflux disease) 8/3/2012   • H. pylori infection 8/3/2012   • Heart attack (HCC) 2001   • Hypertension    • Impaired fasting glucose 8/3/2012     Past Surgical History:   Procedure Laterality Date   • CATARACT EXTRACTION WITH IOL       Social History     Socioeconomic History   • Marital status:      Spouse name: Not on file   • Number of children: Not on file   • Years of education: Not on file   • Highest education level: Not on file   Occupational History   • Not on file   Social Needs   • Financial resource strain: Not on file   • Food insecurity:     Worry: Not on file     Inability: Not on file   • Transportation needs:     Medical: Not on file     Non-medical: Not on file   Tobacco Use   • Smoking status: Former Smoker     Packs/day: 0.50     Years: 50.00     Pack years: 25.00     Types: Cigarettes     Last attempt to quit: 01/2004     Years since quitting: 15.7   • Smokeless tobacco: Never Used   Substance and Sexual Activity   • Alcohol use: Not Currently     Comment: occasionally.    • Drug use: No   • Sexual activity: Yes     Partners: Female     Comment:    Lifestyle   • Physical activity:     Days per week: Not on file     Minutes per session: Not on file   • Stress: Not on file  "  Relationships   • Social connections:     Talks on phone: Not on file     Gets together: Not on file     Attends [employer] service: Not on file     Active member of club or organization: Not on file     Attends meetings of clubs or organizations: Not on file     Relationship status: Not on file   • Intimate partner violence:     Fear of current or ex partner: Not on file     Emotionally abused: Not on file     Physically abused: Not on file     Forced sexual activity: Not on file   Other Topics Concern   • Not on file   Social History Narrative   • Not on file      Family History   Problem Relation Age of Onset   • Diabetes Mother    • Hyperlipidemia Mother    • Hyperlipidemia Other         hyperlipidemia   • Stroke Neg Hx    • Heart Disease Neg Hx     Review of Systems   Constitutional: Negative for chills and fever.   HENT: Negative for sore throat.    Eyes: Positive for discharge, redness and itching. Negative for pain.   Respiratory: Negative for shortness of breath.    Cardiovascular: Negative for chest pain.   Gastrointestinal: Negative for nausea and vomiting.   Genitourinary: Negative for hematuria.   Musculoskeletal: Negative for myalgias.   Skin: Negative for rash.   Neurological: Negative for dizziness.     Allergies   Allergen Reactions   • Pcn [Penicillins] Rash and Nausea     .      Objective:   /62 (BP Location: Left arm, Patient Position: Sitting, BP Cuff Size: Adult)   Pulse 87   Temp 37.3 °C (99.2 °F) (Temporal)   Resp 16   Ht 1.626 m (5' 4\")   Wt 74.4 kg (164 lb)   SpO2 95%   BMI 28.15 kg/m²   Physical Exam   Constitutional: He is oriented to person, place, and time. He appears well-developed and well-nourished. No distress.   HENT:   Head: Normocephalic and atraumatic.   Eyes: Pupils are equal, round, and reactive to light. EOM are normal. Left eye exhibits discharge. Left conjunctiva is injected. Left conjunctiva has no hemorrhage.   Cardiovascular: Normal rate and regular rhythm. "   No murmur heard.  Pulmonary/Chest: Effort normal and breath sounds normal. No respiratory distress.   Abdominal: Soft. He exhibits no distension. There is no tenderness.   Neurological: He is alert and oriented to person, place, and time. He has normal reflexes. No sensory deficit.   Skin: Skin is warm and dry.   Psychiatric: He has a normal mood and affect.         Assessment/Plan:   Assessment    1. Acute bacterial conjunctivitis of left eye  - tobramycin (TOBREX) 0.3 % Solution ophthalmic solution; Place 1 Drop in both eyes every 4 hours.  Dispense: 1 Bottle; Refill: 0    Differential diagnosis, natural history, supportive care, and indications for immediate follow-up discussed.

## 2019-09-26 ASSESSMENT — ENCOUNTER SYMPTOMS
MYALGIAS: 0
FEVER: 0
SORE THROAT: 0
DIZZINESS: 0
SHORTNESS OF BREATH: 0
FOREIGN BODY SENSATION: 0
VOMITING: 0
EYE PAIN: 0
EYE ITCHING: 1
EYE REDNESS: 1
EYE DISCHARGE: 1
NAUSEA: 0
CHILLS: 0

## 2021-01-11 DIAGNOSIS — Z23 NEED FOR VACCINATION: ICD-10-CM

## 2022-01-01 ENCOUNTER — HOME CARE VISIT (OUTPATIENT)
Dept: HOSPICE | Facility: HOSPICE | Age: 87
End: 2022-01-01
Payer: MEDICARE

## 2022-01-01 ENCOUNTER — APPOINTMENT (OUTPATIENT)
Dept: RADIOLOGY | Facility: MEDICAL CENTER | Age: 87
DRG: 085 | End: 2022-01-01
Attending: EMERGENCY MEDICINE
Payer: MEDICARE

## 2022-01-01 ENCOUNTER — HOSPITAL ENCOUNTER (INPATIENT)
Facility: MEDICAL CENTER | Age: 87
LOS: 3 days | DRG: 085 | End: 2022-10-06
Attending: EMERGENCY MEDICINE | Admitting: INTERNAL MEDICINE
Payer: MEDICARE

## 2022-01-01 ENCOUNTER — APPOINTMENT (OUTPATIENT)
Dept: RADIOLOGY | Facility: MEDICAL CENTER | Age: 87
DRG: 086 | End: 2022-01-01
Attending: EMERGENCY MEDICINE
Payer: MEDICARE

## 2022-01-01 ENCOUNTER — APPOINTMENT (OUTPATIENT)
Dept: RADIOLOGY | Facility: MEDICAL CENTER | Age: 87
DRG: 086 | End: 2022-01-01
Attending: HOSPITALIST
Payer: MEDICARE

## 2022-01-01 ENCOUNTER — PHARMACY VISIT (OUTPATIENT)
Dept: PHARMACY | Facility: MEDICAL CENTER | Age: 87
End: 2022-01-01
Payer: COMMERCIAL

## 2022-01-01 ENCOUNTER — HOSPICE ADMISSION (OUTPATIENT)
Dept: HOSPICE | Facility: HOSPICE | Age: 87
End: 2022-01-01
Payer: MEDICARE

## 2022-01-01 ENCOUNTER — APPOINTMENT (OUTPATIENT)
Dept: RADIOLOGY | Facility: MEDICAL CENTER | Age: 87
DRG: 086 | End: 2022-01-01
Payer: MEDICARE

## 2022-01-01 ENCOUNTER — APPOINTMENT (OUTPATIENT)
Dept: RADIOLOGY | Facility: MEDICAL CENTER | Age: 87
DRG: 085 | End: 2022-01-01
Attending: INTERNAL MEDICINE
Payer: MEDICARE

## 2022-01-01 ENCOUNTER — HOME CARE VISIT (OUTPATIENT)
Dept: HOSPICE | Facility: HOSPICE | Age: 87
End: 2022-01-01

## 2022-01-01 ENCOUNTER — HOSPITAL ENCOUNTER (INPATIENT)
Facility: MEDICAL CENTER | Age: 87
LOS: 3 days | DRG: 086 | End: 2022-09-28
Attending: EMERGENCY MEDICINE | Admitting: SURGERY
Payer: MEDICARE

## 2022-01-01 ENCOUNTER — HOSPITAL ENCOUNTER (OUTPATIENT)
Dept: RADIOLOGY | Facility: MEDICAL CENTER | Age: 87
End: 2022-09-26
Attending: SURGERY
Payer: MEDICARE

## 2022-01-01 ENCOUNTER — APPOINTMENT (OUTPATIENT)
Dept: RADIOLOGY | Facility: MEDICAL CENTER | Age: 87
DRG: 086 | End: 2022-01-01
Attending: SURGERY
Payer: MEDICARE

## 2022-01-01 VITALS — HEART RATE: 80 BPM | RESPIRATION RATE: 12 BRPM | DIASTOLIC BLOOD PRESSURE: 60 MMHG | SYSTOLIC BLOOD PRESSURE: 100 MMHG

## 2022-01-01 VITALS — SYSTOLIC BLOOD PRESSURE: 140 MMHG | DIASTOLIC BLOOD PRESSURE: 70 MMHG | RESPIRATION RATE: 12 BRPM | HEART RATE: 80 BPM

## 2022-01-01 VITALS — RESPIRATION RATE: 11 BRPM | SYSTOLIC BLOOD PRESSURE: 106 MMHG | HEART RATE: 80 BPM | DIASTOLIC BLOOD PRESSURE: 60 MMHG

## 2022-01-01 VITALS
HEART RATE: 63 BPM | RESPIRATION RATE: 17 BRPM | BODY MASS INDEX: 19.55 KG/M2 | TEMPERATURE: 97.7 F | HEIGHT: 69 IN | OXYGEN SATURATION: 96 % | WEIGHT: 132 LBS | SYSTOLIC BLOOD PRESSURE: 144 MMHG | DIASTOLIC BLOOD PRESSURE: 64 MMHG

## 2022-01-01 VITALS
OXYGEN SATURATION: 94 % | RESPIRATION RATE: 19 BRPM | DIASTOLIC BLOOD PRESSURE: 61 MMHG | WEIGHT: 132.06 LBS | SYSTOLIC BLOOD PRESSURE: 108 MMHG | BODY MASS INDEX: 22.55 KG/M2 | TEMPERATURE: 97.9 F | HEIGHT: 64 IN | HEART RATE: 66 BPM

## 2022-01-01 VITALS — RESPIRATION RATE: 14 BRPM

## 2022-01-01 DIAGNOSIS — S06.5XAA TRAUMATIC SUBDURAL HEMATOMA, INITIAL ENCOUNTER (HCC): ICD-10-CM

## 2022-01-01 DIAGNOSIS — S06.5XAA SUBDURAL HEMATOMA (HCC): ICD-10-CM

## 2022-01-01 DIAGNOSIS — N39.0 URINARY TRACT INFECTION WITH HEMATURIA, SITE UNSPECIFIED: ICD-10-CM

## 2022-01-01 DIAGNOSIS — K74.69 OTHER CIRRHOSIS OF LIVER (HCC): ICD-10-CM

## 2022-01-01 DIAGNOSIS — K72.91: ICD-10-CM

## 2022-01-01 DIAGNOSIS — I25.10 CORONARY ARTERY DISEASE INVOLVING NATIVE HEART WITHOUT ANGINA PECTORIS, UNSPECIFIED VESSEL OR LESION TYPE: ICD-10-CM

## 2022-01-01 DIAGNOSIS — R31.9 URINARY TRACT INFECTION WITH HEMATURIA, SITE UNSPECIFIED: ICD-10-CM

## 2022-01-01 DIAGNOSIS — K72.10 CHRONIC LIVER FAILURE WITHOUT HEPATIC COMA (HCC): ICD-10-CM

## 2022-01-01 DIAGNOSIS — R41.82 ALTERED MENTAL STATUS, UNSPECIFIED ALTERED MENTAL STATUS TYPE: ICD-10-CM

## 2022-01-01 DIAGNOSIS — I10 PRIMARY HYPERTENSION: ICD-10-CM

## 2022-01-01 LAB
ABO GROUP BLD: NORMAL
ALBUMIN SERPL BCP-MCNC: 2.1 G/DL (ref 3.2–4.9)
ALBUMIN SERPL BCP-MCNC: 2.1 G/DL (ref 3.2–4.9)
ALBUMIN SERPL BCP-MCNC: 2.2 G/DL (ref 3.2–4.9)
ALBUMIN SERPL BCP-MCNC: 2.3 G/DL (ref 3.2–4.9)
ALBUMIN SERPL BCP-MCNC: 2.4 G/DL (ref 3.2–4.9)
ALBUMIN SERPL BCP-MCNC: 2.5 G/DL (ref 3.2–4.9)
ALBUMIN/GLOB SERPL: 0.7 G/DL
ALBUMIN/GLOB SERPL: 0.8 G/DL
ALBUMIN/GLOB SERPL: 0.9 G/DL
ALP SERPL-CCNC: 127 U/L (ref 30–99)
ALP SERPL-CCNC: 131 U/L (ref 30–99)
ALP SERPL-CCNC: 136 U/L (ref 30–99)
ALP SERPL-CCNC: 151 U/L (ref 30–99)
ALP SERPL-CCNC: 152 U/L (ref 30–99)
ALP SERPL-CCNC: 165 U/L (ref 30–99)
ALT SERPL-CCNC: 20 U/L (ref 2–50)
ALT SERPL-CCNC: 21 U/L (ref 2–50)
ALT SERPL-CCNC: 22 U/L (ref 2–50)
ALT SERPL-CCNC: 24 U/L (ref 2–50)
ALT SERPL-CCNC: 27 U/L (ref 2–50)
ALT SERPL-CCNC: 27 U/L (ref 2–50)
AMMONIA PLAS-SCNC: 236 UMOL/L (ref 11–45)
AMMONIA PLAS-SCNC: 51 UMOL/L (ref 11–45)
AMPHET UR QL SCN: NEGATIVE
ANION GAP SERPL CALC-SCNC: 10 MMOL/L (ref 7–16)
ANION GAP SERPL CALC-SCNC: 12 MMOL/L (ref 7–16)
ANION GAP SERPL CALC-SCNC: 5 MMOL/L (ref 7–16)
ANION GAP SERPL CALC-SCNC: 6 MMOL/L (ref 7–16)
ANION GAP SERPL CALC-SCNC: 8 MMOL/L (ref 7–16)
ANION GAP SERPL CALC-SCNC: 8 MMOL/L (ref 7–16)
APAP SERPL-MCNC: <5 UG/ML (ref 10–30)
APPEARANCE UR: ABNORMAL
APPEARANCE UR: ABNORMAL
AST SERPL-CCNC: 29 U/L (ref 12–45)
AST SERPL-CCNC: 30 U/L (ref 12–45)
AST SERPL-CCNC: 41 U/L (ref 12–45)
AST SERPL-CCNC: 42 U/L (ref 12–45)
AST SERPL-CCNC: 49 U/L (ref 12–45)
AST SERPL-CCNC: 58 U/L (ref 12–45)
BACTERIA #/AREA URNS HPF: ABNORMAL /HPF
BACTERIA #/AREA URNS HPF: ABNORMAL /HPF
BACTERIA BLD CULT: NORMAL
BACTERIA BLD CULT: NORMAL
BACTERIA UR CULT: ABNORMAL
BACTERIA UR CULT: ABNORMAL
BARBITURATES UR QL SCN: NEGATIVE
BARCODED ABORH UBTYP: 6200
BARCODED ABORH UBTYP: 8400
BARCODED ABORH UBTYP: 8400
BARCODED PRD CODE UBPRD: NORMAL
BARCODED UNIT NUM UBUNT: NORMAL
BASE EXCESS BLDV CALC-SCNC: -1 MMOL/L
BASOPHILS # BLD AUTO: 0.2 % (ref 0–1.8)
BASOPHILS # BLD AUTO: 0.2 % (ref 0–1.8)
BASOPHILS # BLD AUTO: 0.3 % (ref 0–1.8)
BASOPHILS # BLD AUTO: 0.7 % (ref 0–1.8)
BASOPHILS # BLD AUTO: 0.9 % (ref 0–1.8)
BASOPHILS # BLD AUTO: 1.3 % (ref 0–1.8)
BASOPHILS # BLD: 0.01 K/UL (ref 0–0.12)
BASOPHILS # BLD: 0.01 K/UL (ref 0–0.12)
BASOPHILS # BLD: 0.02 K/UL (ref 0–0.12)
BASOPHILS # BLD: 0.03 K/UL (ref 0–0.12)
BASOPHILS # BLD: 0.03 K/UL (ref 0–0.12)
BASOPHILS # BLD: 0.06 K/UL (ref 0–0.12)
BENZODIAZ UR QL SCN: NEGATIVE
BILIRUB SERPL-MCNC: 2.5 MG/DL (ref 0.1–1.5)
BILIRUB SERPL-MCNC: 2.8 MG/DL (ref 0.1–1.5)
BILIRUB SERPL-MCNC: 2.9 MG/DL (ref 0.1–1.5)
BILIRUB SERPL-MCNC: 3.3 MG/DL (ref 0.1–1.5)
BILIRUB SERPL-MCNC: 3.6 MG/DL (ref 0.1–1.5)
BILIRUB SERPL-MCNC: 4.8 MG/DL (ref 0.1–1.5)
BILIRUB UR QL STRIP.AUTO: ABNORMAL
BILIRUB UR QL STRIP.AUTO: NEGATIVE
BODY TEMPERATURE: 37 CENTIGRADE
BUN SERPL-MCNC: 24 MG/DL (ref 8–22)
BUN SERPL-MCNC: 33 MG/DL (ref 8–22)
BUN SERPL-MCNC: 7 MG/DL (ref 8–22)
BUN SERPL-MCNC: 9 MG/DL (ref 8–22)
BZE UR QL SCN: NEGATIVE
CA-I SERPL-SCNC: 1.2 MMOL/L (ref 1.1–1.3)
CALCIUM SERPL-MCNC: 8.1 MG/DL (ref 8.5–10.5)
CALCIUM SERPL-MCNC: 8.2 MG/DL (ref 8.5–10.5)
CALCIUM SERPL-MCNC: 8.3 MG/DL (ref 8.5–10.5)
CALCIUM SERPL-MCNC: 8.5 MG/DL (ref 8.5–10.5)
CALCIUM SERPL-MCNC: 8.7 MG/DL (ref 8.5–10.5)
CALCIUM SERPL-MCNC: 8.7 MG/DL (ref 8.5–10.5)
CANNABINOIDS UR QL SCN: NEGATIVE
CAOX CRY #/AREA URNS HPF: ABNORMAL /HPF
CFT BLD TEG: 3.5 MIN (ref 4.6–9.1)
CFT BLD TEG: 5.2 MIN (ref 4.6–9.1)
CFT P HPASE BLD TEG: 3.7 MIN (ref 4.3–8.3)
CFT P HPASE BLD TEG: 5.2 MIN (ref 4.3–8.3)
CHLORIDE SERPL-SCNC: 106 MMOL/L (ref 96–112)
CHLORIDE SERPL-SCNC: 107 MMOL/L (ref 96–112)
CHLORIDE SERPL-SCNC: 107 MMOL/L (ref 96–112)
CHLORIDE SERPL-SCNC: 109 MMOL/L (ref 96–112)
CHLORIDE SERPL-SCNC: 110 MMOL/L (ref 96–112)
CHLORIDE SERPL-SCNC: 111 MMOL/L (ref 96–112)
CLOT ANGLE BLD TEG: 67 DEGREES (ref 63–78)
CLOT ANGLE BLD TEG: 71 DEGREES (ref 63–78)
CLOT LYSIS 30M P MA LENFR BLD TEG: 0 % (ref 0–2.6)
CO2 SERPL-SCNC: 19 MMOL/L (ref 20–33)
CO2 SERPL-SCNC: 21 MMOL/L (ref 20–33)
CO2 SERPL-SCNC: 22 MMOL/L (ref 20–33)
CO2 SERPL-SCNC: 22 MMOL/L (ref 20–33)
CO2 SERPL-SCNC: 24 MMOL/L (ref 20–33)
CO2 SERPL-SCNC: 24 MMOL/L (ref 20–33)
COLOR UR: YELLOW
COLOR UR: YELLOW
COMPONENT F 8504F: NORMAL
COMPONENT F 8504F: NORMAL
COMPONENT P 8504P: NORMAL
CREAT SERPL-MCNC: 0.25 MG/DL (ref 0.5–1.4)
CREAT SERPL-MCNC: 0.37 MG/DL (ref 0.5–1.4)
CREAT SERPL-MCNC: 0.46 MG/DL (ref 0.5–1.4)
CREAT SERPL-MCNC: 0.52 MG/DL (ref 0.5–1.4)
CREAT SERPL-MCNC: 0.62 MG/DL (ref 0.5–1.4)
CREAT SERPL-MCNC: 0.73 MG/DL (ref 0.5–1.4)
CT.EXTRINSIC BLD ROTEM: 1.8 MIN (ref 0.8–2.1)
CT.EXTRINSIC BLD ROTEM: 2.8 MIN (ref 0.8–2.1)
EKG IMPRESSION: NORMAL
EKG IMPRESSION: NORMAL
EOSINOPHIL # BLD AUTO: 0 K/UL (ref 0–0.51)
EOSINOPHIL # BLD AUTO: 0.13 K/UL (ref 0–0.51)
EOSINOPHIL # BLD AUTO: 0.13 K/UL (ref 0–0.51)
EOSINOPHIL # BLD AUTO: 0.18 K/UL (ref 0–0.51)
EOSINOPHIL NFR BLD: 0 % (ref 0–6.9)
EOSINOPHIL NFR BLD: 2.7 % (ref 0–6.9)
EOSINOPHIL NFR BLD: 3 % (ref 0–6.9)
EOSINOPHIL NFR BLD: 5.2 % (ref 0–6.9)
EPI CELLS #/AREA URNS HPF: NEGATIVE /HPF
EPI CELLS #/AREA URNS HPF: NEGATIVE /HPF
ERYTHROCYTE [DISTWIDTH] IN BLOOD BY AUTOMATED COUNT: 60.2 FL (ref 35.9–50)
ERYTHROCYTE [DISTWIDTH] IN BLOOD BY AUTOMATED COUNT: 61.2 FL (ref 35.9–50)
ERYTHROCYTE [DISTWIDTH] IN BLOOD BY AUTOMATED COUNT: 63.3 FL (ref 35.9–50)
ERYTHROCYTE [DISTWIDTH] IN BLOOD BY AUTOMATED COUNT: 64.3 FL (ref 35.9–50)
EST. AVERAGE GLUCOSE BLD GHB EST-MCNC: 108 MG/DL
ETHANOL BLD-MCNC: <10.1 MG/DL
FLUAV RNA SPEC QL NAA+PROBE: NEGATIVE
FLUBV RNA SPEC QL NAA+PROBE: NEGATIVE
GFR SERPLBLD CREATININE-BSD FMLA CKD-EPI: 102 ML/MIN/1.73 M 2
GFR SERPLBLD CREATININE-BSD FMLA CKD-EPI: 109 ML/MIN/1.73 M 2
GFR SERPLBLD CREATININE-BSD FMLA CKD-EPI: 123 ML/MIN/1.73 M 2
GFR SERPLBLD CREATININE-BSD FMLA CKD-EPI: 89 ML/MIN/1.73 M 2
GFR SERPLBLD CREATININE-BSD FMLA CKD-EPI: 94 ML/MIN/1.73 M 2
GFR SERPLBLD CREATININE-BSD FMLA CKD-EPI: 99 ML/MIN/1.73 M 2
GLOBULIN SER CALC-MCNC: 2.7 G/DL (ref 1.9–3.5)
GLOBULIN SER CALC-MCNC: 2.8 G/DL (ref 1.9–3.5)
GLOBULIN SER CALC-MCNC: 3 G/DL (ref 1.9–3.5)
GLOBULIN SER CALC-MCNC: 3.2 G/DL (ref 1.9–3.5)
GLUCOSE BLD STRIP.AUTO-MCNC: 125 MG/DL (ref 65–99)
GLUCOSE BLD STRIP.AUTO-MCNC: 151 MG/DL (ref 65–99)
GLUCOSE SERPL-MCNC: 100 MG/DL (ref 65–99)
GLUCOSE SERPL-MCNC: 142 MG/DL (ref 65–99)
GLUCOSE SERPL-MCNC: 154 MG/DL (ref 65–99)
GLUCOSE SERPL-MCNC: 185 MG/DL (ref 65–99)
GLUCOSE SERPL-MCNC: 206 MG/DL (ref 65–99)
GLUCOSE SERPL-MCNC: 94 MG/DL (ref 65–99)
GLUCOSE UR STRIP.AUTO-MCNC: NEGATIVE MG/DL
GLUCOSE UR STRIP.AUTO-MCNC: NEGATIVE MG/DL
HBA1C MFR BLD: 5.4 % (ref 4–5.6)
HCO3 BLDV-SCNC: 22 MMOL/L (ref 24–28)
HCT VFR BLD AUTO: 29.5 % (ref 42–52)
HCT VFR BLD AUTO: 30.9 % (ref 42–52)
HCT VFR BLD AUTO: 33.9 % (ref 42–52)
HCT VFR BLD AUTO: 34 % (ref 42–52)
HCT VFR BLD AUTO: 36.6 % (ref 42–52)
HCT VFR BLD AUTO: 38.4 % (ref 42–52)
HGB BLD-MCNC: 10.2 G/DL (ref 14–18)
HGB BLD-MCNC: 10.7 G/DL (ref 14–18)
HGB BLD-MCNC: 11.3 G/DL (ref 14–18)
HGB BLD-MCNC: 11.9 G/DL (ref 14–18)
HGB BLD-MCNC: 12.8 G/DL (ref 14–18)
HGB BLD-MCNC: 13 G/DL (ref 14–18)
HYALINE CASTS #/AREA URNS LPF: ABNORMAL /LPF
IMM GRANULOCYTES # BLD AUTO: 0.01 K/UL (ref 0–0.11)
IMM GRANULOCYTES # BLD AUTO: 0.02 K/UL (ref 0–0.11)
IMM GRANULOCYTES # BLD AUTO: 0.05 K/UL (ref 0–0.11)
IMM GRANULOCYTES # BLD AUTO: 0.14 K/UL (ref 0–0.11)
IMM GRANULOCYTES NFR BLD AUTO: 0.3 % (ref 0–0.9)
IMM GRANULOCYTES NFR BLD AUTO: 0.4 % (ref 0–0.9)
IMM GRANULOCYTES NFR BLD AUTO: 0.5 % (ref 0–0.9)
IMM GRANULOCYTES NFR BLD AUTO: 0.6 % (ref 0–0.9)
IMM GRANULOCYTES NFR BLD AUTO: 0.9 % (ref 0–0.9)
IMM GRANULOCYTES NFR BLD AUTO: 1.1 % (ref 0–0.9)
INR PPP: 2.56 (ref 0.87–1.13)
KETONES UR STRIP.AUTO-MCNC: NEGATIVE MG/DL
KETONES UR STRIP.AUTO-MCNC: NEGATIVE MG/DL
LACTATE SERPL-SCNC: 2.6 MMOL/L (ref 0.5–2)
LEUKOCYTE ESTERASE UR QL STRIP.AUTO: ABNORMAL
LEUKOCYTE ESTERASE UR QL STRIP.AUTO: ABNORMAL
LIPASE SERPL-CCNC: 59 U/L (ref 11–82)
LYMPHOCYTES # BLD AUTO: 0.36 K/UL (ref 1–4.8)
LYMPHOCYTES # BLD AUTO: 0.58 K/UL (ref 1–4.8)
LYMPHOCYTES # BLD AUTO: 0.8 K/UL (ref 1–4.8)
LYMPHOCYTES # BLD AUTO: 1.25 K/UL (ref 1–4.8)
LYMPHOCYTES # BLD AUTO: 1.3 K/UL (ref 1–4.8)
LYMPHOCYTES # BLD AUTO: 1.7 K/UL (ref 1–4.8)
LYMPHOCYTES NFR BLD: 15.2 % (ref 22–41)
LYMPHOCYTES NFR BLD: 18.2 % (ref 22–41)
LYMPHOCYTES NFR BLD: 2.8 % (ref 22–41)
LYMPHOCYTES NFR BLD: 30.1 % (ref 22–41)
LYMPHOCYTES NFR BLD: 35.7 % (ref 22–41)
LYMPHOCYTES NFR BLD: 36.3 % (ref 22–41)
MAGNESIUM SERPL-MCNC: 1.5 MG/DL (ref 1.5–2.5)
MAGNESIUM SERPL-MCNC: 1.5 MG/DL (ref 1.5–2.5)
MAGNESIUM SERPL-MCNC: 1.9 MG/DL (ref 1.5–2.5)
MCF BLD TEG: 43.7 MM (ref 52–69)
MCF BLD TEG: <40 MM (ref 52–69)
MCF.PLATELET INHIB BLD ROTEM: 14.7 MM (ref 15–32)
MCF.PLATELET INHIB BLD ROTEM: 7.9 MM (ref 15–32)
MCH RBC QN AUTO: 34.7 PG (ref 27–33)
MCH RBC QN AUTO: 35.1 PG (ref 27–33)
MCH RBC QN AUTO: 35.3 PG (ref 27–33)
MCH RBC QN AUTO: 35.3 PG (ref 27–33)
MCH RBC QN AUTO: 35.4 PG (ref 27–33)
MCH RBC QN AUTO: 35.4 PG (ref 27–33)
MCHC RBC AUTO-ENTMCNC: 33.2 G/DL (ref 33.7–35.3)
MCHC RBC AUTO-ENTMCNC: 33.9 G/DL (ref 33.7–35.3)
MCHC RBC AUTO-ENTMCNC: 34.6 G/DL (ref 33.7–35.3)
MCHC RBC AUTO-ENTMCNC: 34.6 G/DL (ref 33.7–35.3)
MCHC RBC AUTO-ENTMCNC: 35 G/DL (ref 33.7–35.3)
MCHC RBC AUTO-ENTMCNC: 35.1 G/DL (ref 33.7–35.3)
MCV RBC AUTO: 100.3 FL (ref 81.4–97.8)
MCV RBC AUTO: 100.9 FL (ref 81.4–97.8)
MCV RBC AUTO: 102.1 FL (ref 81.4–97.8)
MCV RBC AUTO: 102.3 FL (ref 81.4–97.8)
MCV RBC AUTO: 102.4 FL (ref 81.4–97.8)
MCV RBC AUTO: 106.3 FL (ref 81.4–97.8)
METHADONE UR QL SCN: NEGATIVE
MICRO URNS: ABNORMAL
MICRO URNS: ABNORMAL
MONOCYTES # BLD AUTO: 0.06 K/UL (ref 0–0.85)
MONOCYTES # BLD AUTO: 0.33 K/UL (ref 0–0.85)
MONOCYTES # BLD AUTO: 0.5 K/UL (ref 0–0.85)
MONOCYTES # BLD AUTO: 0.54 K/UL (ref 0–0.85)
MONOCYTES # BLD AUTO: 0.55 K/UL (ref 0–0.85)
MONOCYTES # BLD AUTO: 1.04 K/UL (ref 0–0.85)
MONOCYTES NFR BLD AUTO: 1.9 % (ref 0–13.4)
MONOCYTES NFR BLD AUTO: 10.2 % (ref 0–13.4)
MONOCYTES NFR BLD AUTO: 10.5 % (ref 0–13.4)
MONOCYTES NFR BLD AUTO: 12.7 % (ref 0–13.4)
MONOCYTES NFR BLD AUTO: 8.2 % (ref 0–13.4)
MONOCYTES NFR BLD AUTO: 9.6 % (ref 0–13.4)
NEUTROPHILS # BLD AUTO: 1.64 K/UL (ref 1.82–7.42)
NEUTROPHILS # BLD AUTO: 11.19 K/UL (ref 1.82–7.42)
NEUTROPHILS # BLD AUTO: 2.29 K/UL (ref 1.82–7.42)
NEUTROPHILS # BLD AUTO: 2.35 K/UL (ref 1.82–7.42)
NEUTROPHILS # BLD AUTO: 2.52 K/UL (ref 1.82–7.42)
NEUTROPHILS # BLD AUTO: 3.87 K/UL (ref 1.82–7.42)
NEUTROPHILS NFR BLD: 47.7 % (ref 44–72)
NEUTROPHILS NFR BLD: 49.4 % (ref 44–72)
NEUTROPHILS NFR BLD: 53 % (ref 44–72)
NEUTROPHILS NFR BLD: 73.5 % (ref 44–72)
NEUTROPHILS NFR BLD: 79 % (ref 44–72)
NEUTROPHILS NFR BLD: 87.7 % (ref 44–72)
NITRITE UR QL STRIP.AUTO: NEGATIVE
NITRITE UR QL STRIP.AUTO: NEGATIVE
NRBC # BLD AUTO: 0 K/UL
NRBC BLD-RTO: 0 /100 WBC
OPIATES UR QL SCN: NEGATIVE
OXYCODONE UR QL SCN: NEGATIVE
PA AA BLD-ACNC: 35.6 % (ref 0–11)
PA AA BLD-ACNC: ABNORMAL % (ref 0–11)
PA ADP BLD-ACNC: 63.1 % (ref 0–17)
PA ADP BLD-ACNC: ABNORMAL % (ref 0–17)
PCO2 BLDV: 31.8 MMHG (ref 41–51)
PCP UR QL SCN: NEGATIVE
PH BLDV: 7.46 [PH] (ref 7.31–7.45)
PH UR STRIP.AUTO: 7 [PH] (ref 5–8)
PH UR STRIP.AUTO: 8.5 [PH] (ref 5–8)
PHOSPHATE SERPL-MCNC: 2.3 MG/DL (ref 2.5–4.5)
PHOSPHATE SERPL-MCNC: 2.4 MG/DL (ref 2.5–4.5)
PHOSPHATE SERPL-MCNC: 2.9 MG/DL (ref 2.5–4.5)
PLATELET # BLD AUTO: 67 K/UL (ref 164–446)
PLATELET # BLD AUTO: 68 K/UL (ref 164–446)
PLATELET # BLD AUTO: 75 K/UL (ref 164–446)
PLATELET # BLD AUTO: 76 K/UL (ref 164–446)
PLATELET # BLD AUTO: 77 K/UL (ref 164–446)
PLATELET # BLD AUTO: 84 K/UL (ref 164–446)
PMV BLD AUTO: 11.2 FL (ref 9–12.9)
PMV BLD AUTO: 11.2 FL (ref 9–12.9)
PMV BLD AUTO: 11.5 FL (ref 9–12.9)
PMV BLD AUTO: 11.7 FL (ref 9–12.9)
PMV BLD AUTO: 12.3 FL (ref 9–12.9)
PMV BLD AUTO: 12.4 FL (ref 9–12.9)
PO2 BLDV: 65.5 MMHG (ref 25–40)
POTASSIUM SERPL-SCNC: 3.6 MMOL/L (ref 3.6–5.5)
POTASSIUM SERPL-SCNC: 3.6 MMOL/L (ref 3.6–5.5)
POTASSIUM SERPL-SCNC: 4.1 MMOL/L (ref 3.6–5.5)
POTASSIUM SERPL-SCNC: 4.3 MMOL/L (ref 3.6–5.5)
POTASSIUM SERPL-SCNC: 4.5 MMOL/L (ref 3.6–5.5)
POTASSIUM SERPL-SCNC: 4.7 MMOL/L (ref 3.6–5.5)
PROCALCITONIN SERPL-MCNC: 0.05 NG/ML
PRODUCT TYPE UPROD: NORMAL
PROPOXYPH UR QL SCN: NEGATIVE
PROT SERPL-MCNC: 4.8 G/DL (ref 6–8.2)
PROT SERPL-MCNC: 4.9 G/DL (ref 6–8.2)
PROT SERPL-MCNC: 5.1 G/DL (ref 6–8.2)
PROT SERPL-MCNC: 5.7 G/DL (ref 6–8.2)
PROT UR QL STRIP: 100 MG/DL
PROT UR QL STRIP: NEGATIVE MG/DL
PROTHROMBIN TIME: 26.7 SEC (ref 12–14.6)
RBC # BLD AUTO: 2.89 M/UL (ref 4.7–6.1)
RBC # BLD AUTO: 3.02 M/UL (ref 4.7–6.1)
RBC # BLD AUTO: 3.2 M/UL (ref 4.7–6.1)
RBC # BLD AUTO: 3.36 M/UL (ref 4.7–6.1)
RBC # BLD AUTO: 3.65 M/UL (ref 4.7–6.1)
RBC # BLD AUTO: 3.75 M/UL (ref 4.7–6.1)
RBC # URNS HPF: ABNORMAL /HPF
RBC # URNS HPF: ABNORMAL /HPF
RBC UR QL AUTO: ABNORMAL
RBC UR QL AUTO: NEGATIVE
RH BLD: NORMAL
RSV RNA SPEC QL NAA+PROBE: NEGATIVE
SALICYLATES SERPL-MCNC: <1 MG/DL (ref 15–25)
SAO2 % BLDV: 92.7 %
SARS-COV-2 RNA RESP QL NAA+PROBE: NOTDETECTED
SIGNIFICANT IND 70042: ABNORMAL
SIGNIFICANT IND 70042: NORMAL
SIGNIFICANT IND 70042: NORMAL
SITE SITE: ABNORMAL
SITE SITE: NORMAL
SITE SITE: NORMAL
SODIUM SERPL-SCNC: 135 MMOL/L (ref 135–145)
SODIUM SERPL-SCNC: 137 MMOL/L (ref 135–145)
SODIUM SERPL-SCNC: 138 MMOL/L (ref 135–145)
SODIUM SERPL-SCNC: 139 MMOL/L (ref 135–145)
SODIUM SERPL-SCNC: 141 MMOL/L (ref 135–145)
SODIUM SERPL-SCNC: 141 MMOL/L (ref 135–145)
SOURCE SOURCE: ABNORMAL
SOURCE SOURCE: NORMAL
SOURCE SOURCE: NORMAL
SP GR UR STRIP.AUTO: 1.01
SP GR UR STRIP.AUTO: 1.02
SPECIMEN SOURCE: NORMAL
TEG ALGORITHM TGALG: ABNORMAL
TEG ALGORITHM TGALG: ABNORMAL
TRI-PHOS CRY #/AREA URNS HPF: ABNORMAL /HPF
TROPONIN T SERPL-MCNC: 13 NG/L (ref 6–19)
TROPONIN T SERPL-MCNC: 20 NG/L (ref 6–19)
TSH SERPL DL<=0.005 MIU/L-ACNC: 4.21 UIU/ML (ref 0.38–5.33)
UNIT STATUS USTAT: NORMAL
UROBILINOGEN UR STRIP.AUTO-MCNC: 2 MG/DL
UROBILINOGEN UR STRIP.AUTO-MCNC: >=8 MG/DL
WBC # BLD AUTO: 12.8 K/UL (ref 4.8–10.8)
WBC # BLD AUTO: 3.2 K/UL (ref 4.8–10.8)
WBC # BLD AUTO: 3.4 K/UL (ref 4.8–10.8)
WBC # BLD AUTO: 4.3 K/UL (ref 4.8–10.8)
WBC # BLD AUTO: 4.8 K/UL (ref 4.8–10.8)
WBC # BLD AUTO: 5.3 K/UL (ref 4.8–10.8)
WBC #/AREA URNS HPF: ABNORMAL /HPF
WBC #/AREA URNS HPF: ABNORMAL /HPF

## 2022-01-01 PROCEDURE — 84100 ASSAY OF PHOSPHORUS: CPT

## 2022-01-01 PROCEDURE — 700111 HCHG RX REV CODE 636 W/ 250 OVERRIDE (IP): Performed by: HOSPITALIST

## 2022-01-01 PROCEDURE — 770001 HCHG ROOM/CARE - MED/SURG/GYN PRIV*

## 2022-01-01 PROCEDURE — 700102 HCHG RX REV CODE 250 W/ 637 OVERRIDE(OP): Performed by: SURGERY

## 2022-01-01 PROCEDURE — 96365 THER/PROPH/DIAG IV INF INIT: CPT

## 2022-01-01 PROCEDURE — P9017 PLASMA 1 DONOR FRZ W/IN 8 HR: HCPCS | Mod: 91

## 2022-01-01 PROCEDURE — 96375 TX/PRO/DX INJ NEW DRUG ADDON: CPT

## 2022-01-01 PROCEDURE — 85347 COAGULATION TIME ACTIVATED: CPT

## 2022-01-01 PROCEDURE — S9126 HOSPICE CARE, IN THE HOME, P: HCPCS

## 2022-01-01 PROCEDURE — 0241U HCHG SARS-COV-2 COVID-19 NFCT DS RESP RNA 4 TRGT MIC: CPT

## 2022-01-01 PROCEDURE — 36415 COLL VENOUS BLD VENIPUNCTURE: CPT

## 2022-01-01 PROCEDURE — 700102 HCHG RX REV CODE 250 W/ 637 OVERRIDE(OP): Performed by: INTERNAL MEDICINE

## 2022-01-01 PROCEDURE — A9270 NON-COVERED ITEM OR SERVICE: HCPCS | Performed by: HOSPITALIST

## 2022-01-01 PROCEDURE — 700105 HCHG RX REV CODE 258: Performed by: SURGERY

## 2022-01-01 PROCEDURE — 30233K1 TRANSFUSION OF NONAUTOLOGOUS FROZEN PLASMA INTO PERIPHERAL VEIN, PERCUTANEOUS APPROACH: ICD-10-PCS | Performed by: SURGERY

## 2022-01-01 PROCEDURE — 99231 SBSQ HOSP IP/OBS SF/LOW 25: CPT | Performed by: INTERNAL MEDICINE

## 2022-01-01 PROCEDURE — 82140 ASSAY OF AMMONIA: CPT

## 2022-01-01 PROCEDURE — 700111 HCHG RX REV CODE 636 W/ 250 OVERRIDE (IP): Performed by: SURGERY

## 2022-01-01 PROCEDURE — 85025 COMPLETE CBC W/AUTO DIFF WBC: CPT

## 2022-01-01 PROCEDURE — G0299 HHS/HOSPICE OF RN EA 15 MIN: HCPCS

## 2022-01-01 PROCEDURE — 99239 HOSP IP/OBS DSCHRG MGMT >30: CPT | Performed by: HOSPITALIST

## 2022-01-01 PROCEDURE — 700101 HCHG RX REV CODE 250: Performed by: INTERNAL MEDICINE

## 2022-01-01 PROCEDURE — 85384 FIBRINOGEN ACTIVITY: CPT

## 2022-01-01 PROCEDURE — 87040 BLOOD CULTURE FOR BACTERIA: CPT

## 2022-01-01 PROCEDURE — 87086 URINE CULTURE/COLONY COUNT: CPT

## 2022-01-01 PROCEDURE — 97162 PT EVAL MOD COMPLEX 30 MIN: CPT

## 2022-01-01 PROCEDURE — 700105 HCHG RX REV CODE 258: Performed by: INTERNAL MEDICINE

## 2022-01-01 PROCEDURE — 80143 DRUG ASSAY ACETAMINOPHEN: CPT

## 2022-01-01 PROCEDURE — 80179 DRUG ASSAY SALICYLATE: CPT

## 2022-01-01 PROCEDURE — 700102 HCHG RX REV CODE 250 W/ 637 OVERRIDE(OP): Performed by: HOSPITALIST

## 2022-01-01 PROCEDURE — 304538 HCHG NG TUBE

## 2022-01-01 PROCEDURE — 82803 BLOOD GASES ANY COMBINATION: CPT

## 2022-01-01 PROCEDURE — 83735 ASSAY OF MAGNESIUM: CPT

## 2022-01-01 PROCEDURE — 93005 ELECTROCARDIOGRAM TRACING: CPT | Performed by: EMERGENCY MEDICINE

## 2022-01-01 PROCEDURE — 71045 X-RAY EXAM CHEST 1 VIEW: CPT

## 2022-01-01 PROCEDURE — 84145 PROCALCITONIN (PCT): CPT

## 2022-01-01 PROCEDURE — 665036 HSPC NOTICE OF ELECTION NOE

## 2022-01-01 PROCEDURE — 80307 DRUG TEST PRSMV CHEM ANLYZR: CPT

## 2022-01-01 PROCEDURE — 700111 HCHG RX REV CODE 636 W/ 250 OVERRIDE (IP): Performed by: INTERNAL MEDICINE

## 2022-01-01 PROCEDURE — 84443 ASSAY THYROID STIM HORMONE: CPT

## 2022-01-01 PROCEDURE — 95822 EEG COMA OR SLEEP ONLY: CPT | Performed by: STUDENT IN AN ORGANIZED HEALTH CARE EDUCATION/TRAINING PROGRAM

## 2022-01-01 PROCEDURE — 80053 COMPREHEN METABOLIC PANEL: CPT

## 2022-01-01 PROCEDURE — 99232 SBSQ HOSP IP/OBS MODERATE 35: CPT | Performed by: INTERNAL MEDICINE

## 2022-01-01 PROCEDURE — 99223 1ST HOSP IP/OBS HIGH 75: CPT | Mod: AI | Performed by: INTERNAL MEDICINE

## 2022-01-01 PROCEDURE — 86901 BLOOD TYPING SEROLOGIC RH(D): CPT

## 2022-01-01 PROCEDURE — 95822 EEG COMA OR SLEEP ONLY: CPT | Mod: 26 | Performed by: STUDENT IN AN ORGANIZED HEALTH CARE EDUCATION/TRAINING PROGRAM

## 2022-01-01 PROCEDURE — RXMED WILLOW AMBULATORY MEDICATION CHARGE: Performed by: NURSE PRACTITIONER

## 2022-01-01 PROCEDURE — 99239 HOSP IP/OBS DSCHRG MGMT >30: CPT | Performed by: INTERNAL MEDICINE

## 2022-01-01 PROCEDURE — 83605 ASSAY OF LACTIC ACID: CPT

## 2022-01-01 PROCEDURE — G0156 HHCP-SVS OF AIDE,EA 15 MIN: HCPCS

## 2022-01-01 PROCEDURE — 36430 TRANSFUSION BLD/BLD COMPNT: CPT

## 2022-01-01 PROCEDURE — 87186 SC STD MICRODIL/AGAR DIL: CPT

## 2022-01-01 PROCEDURE — 700111 HCHG RX REV CODE 636 W/ 250 OVERRIDE (IP)

## 2022-01-01 PROCEDURE — RXMED WILLOW AMBULATORY MEDICATION CHARGE: Performed by: HOSPITALIST

## 2022-01-01 PROCEDURE — A9270 NON-COVERED ITEM OR SERVICE: HCPCS | Performed by: SURGERY

## 2022-01-01 PROCEDURE — 70450 CT HEAD/BRAIN W/O DYE: CPT

## 2022-01-01 PROCEDURE — 85576 BLOOD PLATELET AGGREGATION: CPT | Mod: 91

## 2022-01-01 PROCEDURE — 96376 TX/PRO/DX INJ SAME DRUG ADON: CPT

## 2022-01-01 PROCEDURE — A9270 NON-COVERED ITEM OR SERVICE: HCPCS | Performed by: INTERNAL MEDICINE

## 2022-01-01 PROCEDURE — 770022 HCHG ROOM/CARE - ICU (200)

## 2022-01-01 PROCEDURE — 30233R1 TRANSFUSION OF NONAUTOLOGOUS PLATELETS INTO PERIPHERAL VEIN, PERCUTANEOUS APPROACH: ICD-10-PCS | Performed by: SURGERY

## 2022-01-01 PROCEDURE — 99233 SBSQ HOSP IP/OBS HIGH 50: CPT | Performed by: SURGERY

## 2022-01-01 PROCEDURE — 99232 SBSQ HOSP IP/OBS MODERATE 35: CPT | Performed by: HOSPITALIST

## 2022-01-01 PROCEDURE — 700101 HCHG RX REV CODE 250: Performed by: SURGERY

## 2022-01-01 PROCEDURE — 97602 WOUND(S) CARE NON-SELECTIVE: CPT

## 2022-01-01 PROCEDURE — 84484 ASSAY OF TROPONIN QUANT: CPT

## 2022-01-01 PROCEDURE — 99285 EMERGENCY DEPT VISIT HI MDM: CPT

## 2022-01-01 PROCEDURE — 76705 ECHO EXAM OF ABDOMEN: CPT

## 2022-01-01 PROCEDURE — 82962 GLUCOSE BLOOD TEST: CPT

## 2022-01-01 PROCEDURE — 700111 HCHG RX REV CODE 636 W/ 250 OVERRIDE (IP): Performed by: EMERGENCY MEDICINE

## 2022-01-01 PROCEDURE — 81001 URINALYSIS AUTO W/SCOPE: CPT

## 2022-01-01 PROCEDURE — 99291 CRITICAL CARE FIRST HOUR: CPT

## 2022-01-01 PROCEDURE — 97166 OT EVAL MOD COMPLEX 45 MIN: CPT

## 2022-01-01 PROCEDURE — 99223 1ST HOSP IP/OBS HIGH 75: CPT | Performed by: HOSPITALIST

## 2022-01-01 PROCEDURE — 86900 BLOOD TYPING SEROLOGIC ABO: CPT

## 2022-01-01 PROCEDURE — 99222 1ST HOSP IP/OBS MODERATE 55: CPT | Performed by: SURGERY

## 2022-01-01 PROCEDURE — 87077 CULTURE AEROBIC IDENTIFY: CPT

## 2022-01-01 PROCEDURE — 85610 PROTHROMBIN TIME: CPT

## 2022-01-01 PROCEDURE — 92610 EVALUATE SWALLOWING FUNCTION: CPT

## 2022-01-01 PROCEDURE — 83690 ASSAY OF LIPASE: CPT

## 2022-01-01 PROCEDURE — 82077 ASSAY SPEC XCP UR&BREATH IA: CPT

## 2022-01-01 PROCEDURE — 83036 HEMOGLOBIN GLYCOSYLATED A1C: CPT

## 2022-01-01 PROCEDURE — 302307 BAG FECAL MANAGEMENT TEMPORARY COLLECTION WITH FILTER - SEAL SIGNAL FMS: Performed by: HOSPITALIST

## 2022-01-01 PROCEDURE — 82330 ASSAY OF CALCIUM: CPT

## 2022-01-01 PROCEDURE — C9803 HOPD COVID-19 SPEC COLLECT: HCPCS | Performed by: EMERGENCY MEDICINE

## 2022-01-01 PROCEDURE — P9034 PLATELETS, PHERESIS: HCPCS

## 2022-01-01 RX ORDER — ALPRAZOLAM 0.5 MG/1
0.5 TABLET ORAL 3 TIMES DAILY PRN
Status: DISCONTINUED | OUTPATIENT
Start: 2022-01-01 | End: 2022-01-01

## 2022-01-01 RX ORDER — LORAZEPAM 2 MG/ML
2 CONCENTRATE ORAL EVERY 4 HOURS
Qty: 120 ML | Refills: 0 | Status: SHIPPED | OUTPATIENT
Start: 2022-01-01 | End: 2022-01-01

## 2022-01-01 RX ORDER — ONDANSETRON 2 MG/ML
4 INJECTION INTRAMUSCULAR; INTRAVENOUS EVERY 4 HOURS PRN
Status: DISCONTINUED | OUTPATIENT
Start: 2022-01-01 | End: 2022-01-01 | Stop reason: HOSPADM

## 2022-01-01 RX ORDER — TRAMADOL HYDROCHLORIDE 50 MG/1
50 TABLET ORAL EVERY 6 HOURS PRN
Status: DISCONTINUED | OUTPATIENT
Start: 2022-01-01 | End: 2022-01-01 | Stop reason: HOSPADM

## 2022-01-01 RX ORDER — SODIUM CHLORIDE 9 MG/ML
INJECTION, SOLUTION INTRAVENOUS CONTINUOUS
Status: DISCONTINUED | OUTPATIENT
Start: 2022-01-01 | End: 2022-01-01 | Stop reason: HOSPADM

## 2022-01-01 RX ORDER — LEVETIRACETAM 500 MG/1
500 TABLET ORAL EVERY 12 HOURS
Status: DISCONTINUED | OUTPATIENT
Start: 2022-01-01 | End: 2022-01-01 | Stop reason: HOSPADM

## 2022-01-01 RX ORDER — DEXAMETHASONE SODIUM PHOSPHATE 4 MG/ML
4 INJECTION, SOLUTION INTRA-ARTICULAR; INTRALESIONAL; INTRAMUSCULAR; INTRAVENOUS; SOFT TISSUE EVERY 6 HOURS
Status: DISCONTINUED | OUTPATIENT
Start: 2022-01-01 | End: 2022-01-01 | Stop reason: HOSPADM

## 2022-01-01 RX ORDER — MAGNESIUM SULFATE HEPTAHYDRATE 40 MG/ML
2 INJECTION, SOLUTION INTRAVENOUS ONCE
Status: COMPLETED | OUTPATIENT
Start: 2022-01-01 | End: 2022-01-01

## 2022-01-01 RX ORDER — HALOPERIDOL 5 MG/ML
5 INJECTION INTRAMUSCULAR EVERY 6 HOURS PRN
Status: DISCONTINUED | OUTPATIENT
Start: 2022-01-01 | End: 2022-01-01 | Stop reason: HOSPADM

## 2022-01-01 RX ORDER — CEFTRIAXONE 1 G/1
1 INJECTION, POWDER, FOR SOLUTION INTRAMUSCULAR; INTRAVENOUS ONCE
Status: COMPLETED | OUTPATIENT
Start: 2022-01-01 | End: 2022-01-01

## 2022-01-01 RX ORDER — ENEMA 19; 7 G/133ML; G/133ML
1 ENEMA RECTAL
Status: DISCONTINUED | OUTPATIENT
Start: 2022-01-01 | End: 2022-01-01 | Stop reason: HOSPADM

## 2022-01-01 RX ORDER — LEVETIRACETAM 500 MG/5ML
500 INJECTION, SOLUTION, CONCENTRATE INTRAVENOUS EVERY 12 HOURS
Status: DISCONTINUED | OUTPATIENT
Start: 2022-01-01 | End: 2022-01-01 | Stop reason: HOSPADM

## 2022-01-01 RX ORDER — POLYETHYLENE GLYCOL 3350 17 G/17G
POWDER, FOR SOLUTION ORAL
Refills: 3 | COMMUNITY
Start: 2022-01-01 | End: 2022-01-01

## 2022-01-01 RX ORDER — ACETAMINOPHEN 325 MG/1
650 TABLET ORAL EVERY 6 HOURS PRN
Status: DISCONTINUED | OUTPATIENT
Start: 2022-01-01 | End: 2022-01-01 | Stop reason: HOSPADM

## 2022-01-01 RX ORDER — LACTULOSE 10 G/15ML
300 SOLUTION ORAL EVERY 4 HOURS PRN
Status: DISCONTINUED | OUTPATIENT
Start: 2022-01-01 | End: 2022-01-01 | Stop reason: HOSPADM

## 2022-01-01 RX ORDER — AMOXICILLIN 250 MG
1 CAPSULE ORAL NIGHTLY
Status: DISCONTINUED | OUTPATIENT
Start: 2022-01-01 | End: 2022-01-01 | Stop reason: HOSPADM

## 2022-01-01 RX ORDER — LACTULOSE 10 G/15ML
300 SOLUTION ORAL EVERY 6 HOURS
Status: DISPENSED | OUTPATIENT
Start: 2022-01-01 | End: 2022-01-01

## 2022-01-01 RX ORDER — BISACODYL 10 MG
10 SUPPOSITORY, RECTAL RECTAL
Status: DISCONTINUED | OUTPATIENT
Start: 2022-01-01 | End: 2022-01-01 | Stop reason: HOSPADM

## 2022-01-01 RX ORDER — ONDANSETRON 4 MG/1
4 TABLET, ORALLY DISINTEGRATING ORAL EVERY 4 HOURS PRN
Qty: 15 TABLET | Refills: 10 | OUTPATIENT
Start: 2022-01-01

## 2022-01-01 RX ORDER — ONDANSETRON 4 MG/1
4 TABLET, ORALLY DISINTEGRATING ORAL EVERY 4 HOURS PRN
Status: DISCONTINUED | OUTPATIENT
Start: 2022-01-01 | End: 2022-01-01 | Stop reason: HOSPADM

## 2022-01-01 RX ORDER — DOCUSATE SODIUM 100 MG/1
100 CAPSULE, LIQUID FILLED ORAL 2 TIMES DAILY
Status: DISCONTINUED | OUTPATIENT
Start: 2022-01-01 | End: 2022-01-01 | Stop reason: HOSPADM

## 2022-01-01 RX ORDER — HALOPERIDOL 2 MG/ML
2 SOLUTION ORAL EVERY 4 HOURS PRN
Qty: 30 ML | Refills: 10 | OUTPATIENT
Start: 2022-01-01

## 2022-01-01 RX ORDER — MORPHINE SULFATE 4 MG/ML
4 INJECTION INTRAVENOUS
Status: DISCONTINUED | OUTPATIENT
Start: 2022-01-01 | End: 2022-01-01

## 2022-01-01 RX ORDER — DEXAMETHASONE 4 MG/1
2 TABLET ORAL EVERY 8 HOURS
Status: DISCONTINUED | OUTPATIENT
Start: 2022-01-01 | End: 2022-01-01 | Stop reason: HOSPADM

## 2022-01-01 RX ORDER — AMOXICILLIN 250 MG
2 CAPSULE ORAL 2 TIMES DAILY
Status: DISCONTINUED | OUTPATIENT
Start: 2022-01-01 | End: 2022-01-01 | Stop reason: HOSPADM

## 2022-01-01 RX ORDER — ACETAMINOPHEN 650 MG/1
650 SUPPOSITORY RECTAL EVERY 6 HOURS PRN
Qty: 5 SUPPOSITORY | Refills: 10 | OUTPATIENT
Start: 2022-01-01

## 2022-01-01 RX ORDER — LEVETIRACETAM 500 MG/1
500 TABLET ORAL 2 TIMES DAILY
Qty: 60 TABLET | Refills: 0 | Status: SHIPPED | OUTPATIENT
Start: 2022-01-01 | End: 2022-01-01

## 2022-01-01 RX ORDER — BISACODYL 10 MG
10 SUPPOSITORY, RECTAL RECTAL
Qty: 5 SUPPOSITORY | Refills: 10 | OUTPATIENT
Start: 2022-01-01

## 2022-01-01 RX ORDER — POLYETHYLENE GLYCOL 3350 17 G/17G
1 POWDER, FOR SOLUTION ORAL 2 TIMES DAILY
Status: DISCONTINUED | OUTPATIENT
Start: 2022-01-01 | End: 2022-01-01 | Stop reason: HOSPADM

## 2022-01-01 RX ORDER — OMEPRAZOLE 20 MG/1
20 CAPSULE, DELAYED RELEASE ORAL DAILY
Qty: 14 CAPSULE | Refills: 0 | Status: SHIPPED | OUTPATIENT
Start: 2022-01-01 | End: 2022-01-01

## 2022-01-01 RX ORDER — DIAZEPAM 5 MG/ML
2.5 INJECTION, SOLUTION INTRAMUSCULAR; INTRAVENOUS EVERY 6 HOURS PRN
Status: DISCONTINUED | OUTPATIENT
Start: 2022-01-01 | End: 2022-01-01 | Stop reason: HOSPADM

## 2022-01-01 RX ORDER — AMOXICILLIN 250 MG
2 CAPSULE ORAL 2 TIMES DAILY
Qty: 30 TABLET | Refills: 0 | COMMUNITY
Start: 2022-01-01 | End: 2022-01-01

## 2022-01-01 RX ORDER — MORPHINE SULFATE 4 MG/ML
2 INJECTION INTRAVENOUS
Status: DISCONTINUED | OUTPATIENT
Start: 2022-01-01 | End: 2022-01-01

## 2022-01-01 RX ORDER — LORAZEPAM 1 MG/1
1 TABLET ORAL EVERY 6 HOURS PRN
Qty: 12 TABLET | Refills: 0 | Status: SHIPPED | OUTPATIENT
Start: 2022-01-01 | End: 2022-01-01

## 2022-01-01 RX ORDER — LEVETIRACETAM 500 MG/1
500 TABLET ORAL EVERY 12 HOURS
Qty: 10 TABLET | Refills: 0 | Status: ON HOLD | OUTPATIENT
Start: 2022-01-01 | End: 2022-01-01

## 2022-01-01 RX ORDER — AMLODIPINE BESYLATE 10 MG/1
5 TABLET ORAL
Status: DISCONTINUED | OUTPATIENT
Start: 2022-01-01 | End: 2022-01-01 | Stop reason: HOSPADM

## 2022-01-01 RX ORDER — LABETALOL HYDROCHLORIDE 5 MG/ML
10 INJECTION, SOLUTION INTRAVENOUS EVERY 4 HOURS PRN
Status: DISCONTINUED | OUTPATIENT
Start: 2022-01-01 | End: 2022-01-01 | Stop reason: HOSPADM

## 2022-01-01 RX ORDER — FAMOTIDINE 20 MG/1
20 TABLET, FILM COATED ORAL 2 TIMES DAILY
Qty: 60 TABLET | COMMUNITY
Start: 2022-01-01 | End: 2022-01-01

## 2022-01-01 RX ORDER — MORPHINE SULFATE 100 MG/5ML
10 SOLUTION ORAL
Qty: 120 ML | Refills: 0 | Status: SHIPPED | OUTPATIENT
Start: 2022-01-01 | End: 2023-10-21

## 2022-01-01 RX ORDER — MORPHINE SULFATE 100 MG/5ML
5 SOLUTION ORAL
Qty: 120 ML | Refills: 0 | Status: SHIPPED | OUTPATIENT
Start: 2022-01-01 | End: 2022-01-01 | Stop reason: SDUPTHER

## 2022-01-01 RX ORDER — LACTULOSE 20 G/30ML
45 SOLUTION ORAL 3 TIMES DAILY
Qty: 450 ML | Refills: 0 | Status: SHIPPED | OUTPATIENT
Start: 2022-01-01 | End: 2022-01-01

## 2022-01-01 RX ORDER — POLYETHYLENE GLYCOL 3350 17 G/17G
1 POWDER, FOR SOLUTION ORAL
Status: DISCONTINUED | OUTPATIENT
Start: 2022-01-01 | End: 2022-01-01 | Stop reason: HOSPADM

## 2022-01-01 RX ORDER — OMEPRAZOLE 20 MG/1
20 CAPSULE, DELAYED RELEASE ORAL DAILY
Status: DISCONTINUED | OUTPATIENT
Start: 2022-01-01 | End: 2022-01-01

## 2022-01-01 RX ORDER — LORAZEPAM 2 MG/ML
0.5 INJECTION INTRAMUSCULAR EVERY 4 HOURS PRN
Status: DISCONTINUED | OUTPATIENT
Start: 2022-01-01 | End: 2022-01-01

## 2022-01-01 RX ORDER — LACTULOSE 20 G/30ML
45 SOLUTION ORAL 3 TIMES DAILY
Status: DISCONTINUED | OUTPATIENT
Start: 2022-01-01 | End: 2022-01-01 | Stop reason: HOSPADM

## 2022-01-01 RX ORDER — MORPHINE SULFATE 100 MG/5ML
10 SOLUTION ORAL
Qty: 120 ML | Refills: 0 | Status: CANCELLED | OUTPATIENT
Start: 2022-01-01 | End: 2023-10-21

## 2022-01-01 RX ORDER — AMLODIPINE BESYLATE 5 MG/1
5 TABLET ORAL DAILY
Qty: 30 TABLET | Refills: 0 | Status: SHIPPED | OUTPATIENT
Start: 2022-01-01 | End: 2022-01-01

## 2022-01-01 RX ORDER — AMOXICILLIN 250 MG
1 CAPSULE ORAL
Status: DISCONTINUED | OUTPATIENT
Start: 2022-01-01 | End: 2022-01-01 | Stop reason: HOSPADM

## 2022-01-01 RX ORDER — DEXAMETHASONE 2 MG/1
2 TABLET ORAL EVERY 8 HOURS
Qty: 27 TABLET | Refills: 0 | Status: SHIPPED | OUTPATIENT
Start: 2022-01-01 | End: 2022-01-01

## 2022-01-01 RX ORDER — ACETAMINOPHEN 325 MG/1
325 TABLET ORAL EVERY 4 HOURS PRN
Qty: 30 TABLET | Refills: 0 | Status: SHIPPED | OUTPATIENT
Start: 2022-01-01 | End: 2022-01-01

## 2022-01-01 RX ADMIN — LEVETIRACETAM 500 MG: 100 INJECTION, SOLUTION INTRAVENOUS at 04:43

## 2022-01-01 RX ADMIN — SODIUM CHLORIDE: 9 INJECTION, SOLUTION INTRAVENOUS at 11:26

## 2022-01-01 RX ADMIN — CEFTRIAXONE SODIUM 1 G: 10 INJECTION, POWDER, FOR SOLUTION INTRAVENOUS at 17:55

## 2022-01-01 RX ADMIN — CEFTRIAXONE SODIUM 1 G: 10 INJECTION, POWDER, FOR SOLUTION INTRAVENOUS at 20:53

## 2022-01-01 RX ADMIN — DEXAMETHASONE SODIUM PHOSPHATE 4 MG: 4 INJECTION, SOLUTION INTRA-ARTICULAR; INTRALESIONAL; INTRAMUSCULAR; INTRAVENOUS; SOFT TISSUE at 00:39

## 2022-01-01 RX ADMIN — DEXAMETHASONE SODIUM PHOSPHATE 4 MG: 4 INJECTION, SOLUTION INTRA-ARTICULAR; INTRALESIONAL; INTRAMUSCULAR; INTRAVENOUS; SOFT TISSUE at 06:26

## 2022-01-01 RX ADMIN — MAGNESIUM SULFATE HEPTAHYDRATE 2 G: 40 INJECTION, SOLUTION INTRAVENOUS at 12:01

## 2022-01-01 RX ADMIN — FAMOTIDINE 20 MG: 10 INJECTION, SOLUTION INTRAVENOUS at 17:11

## 2022-01-01 RX ADMIN — LORAZEPAM 2 MG: 2 CONCENTRATE ORAL at 15:04

## 2022-01-01 RX ADMIN — DEXAMETHASONE SODIUM PHOSPHATE 4 MG: 4 INJECTION, SOLUTION INTRA-ARTICULAR; INTRALESIONAL; INTRAMUSCULAR; INTRAVENOUS; SOFT TISSUE at 11:59

## 2022-01-01 RX ADMIN — SENNOSIDES AND DOCUSATE SODIUM 2 TABLET: 50; 8.6 TABLET ORAL at 05:43

## 2022-01-01 RX ADMIN — DEXAMETHASONE SODIUM PHOSPHATE 4 MG: 4 INJECTION, SOLUTION INTRA-ARTICULAR; INTRALESIONAL; INTRAMUSCULAR; INTRAVENOUS; SOFT TISSUE at 12:30

## 2022-01-01 RX ADMIN — SODIUM CHLORIDE: 9 INJECTION, SOLUTION INTRAVENOUS at 01:48

## 2022-01-01 RX ADMIN — LACTULOSE 45 ML: 20 SOLUTION ORAL at 06:39

## 2022-01-01 RX ADMIN — LEVETIRACETAM 500 MG: 100 INJECTION, SOLUTION INTRAVENOUS at 05:03

## 2022-01-01 RX ADMIN — SODIUM CHLORIDE: 9 INJECTION, SOLUTION INTRAVENOUS at 20:43

## 2022-01-01 RX ADMIN — LORAZEPAM 1 MG: 2 CONCENTRATE ORAL at 12:33

## 2022-01-01 RX ADMIN — LEVETIRACETAM 500 MG: 100 INJECTION, SOLUTION INTRAVENOUS at 17:57

## 2022-01-01 RX ADMIN — LEVETIRACETAM 500 MG: 500 TABLET, FILM COATED ORAL at 20:43

## 2022-01-01 RX ADMIN — DEXAMETHASONE SODIUM PHOSPHATE 4 MG: 4 INJECTION, SOLUTION INTRA-ARTICULAR; INTRALESIONAL; INTRAMUSCULAR; INTRAVENOUS; SOFT TISSUE at 00:18

## 2022-01-01 RX ADMIN — DOCUSATE SODIUM 50 MG AND SENNOSIDES 8.6 MG 1 TABLET: 8.6; 5 TABLET, FILM COATED ORAL at 21:35

## 2022-01-01 RX ADMIN — THIAMINE HYDROCHLORIDE 100 MG: 100 INJECTION, SOLUTION INTRAMUSCULAR; INTRAVENOUS at 01:48

## 2022-01-01 RX ADMIN — DIAZEPAM 2.5 MG: 5 INJECTION, SOLUTION INTRAMUSCULAR; INTRAVENOUS at 00:34

## 2022-01-01 RX ADMIN — SODIUM CHLORIDE: 9 INJECTION, SOLUTION INTRAVENOUS at 08:15

## 2022-01-01 RX ADMIN — DIBASIC SODIUM PHOSPHATE, MONOBASIC POTASSIUM PHOSPHATE AND MONOBASIC SODIUM PHOSPHATE 250 MG: 852; 155; 130 TABLET ORAL at 17:24

## 2022-01-01 RX ADMIN — DEXAMETHASONE SODIUM PHOSPHATE 4 MG: 4 INJECTION, SOLUTION INTRA-ARTICULAR; INTRALESIONAL; INTRAMUSCULAR; INTRAVENOUS; SOFT TISSUE at 17:59

## 2022-01-01 RX ADMIN — LACTULOSE 300 ML: 10 SOLUTION ORAL; RECTAL at 17:54

## 2022-01-01 RX ADMIN — LEVETIRACETAM 500 MG: 100 INJECTION, SOLUTION INTRAVENOUS at 04:24

## 2022-01-01 RX ADMIN — LEVETIRACETAM 500 MG: 100 INJECTION, SOLUTION INTRAVENOUS at 05:06

## 2022-01-01 RX ADMIN — LEVETIRACETAM 500 MG: 100 INJECTION, SOLUTION INTRAVENOUS at 17:54

## 2022-01-01 RX ADMIN — AMLODIPINE BESYLATE 5 MG: 10 TABLET ORAL at 19:43

## 2022-01-01 RX ADMIN — CEFTRIAXONE SODIUM 1 G: 10 INJECTION, POWDER, FOR SOLUTION INTRAVENOUS at 17:06

## 2022-01-01 RX ADMIN — DEXAMETHASONE 2 MG: 4 TABLET ORAL at 17:13

## 2022-01-01 RX ADMIN — FAMOTIDINE 20 MG: 10 INJECTION, SOLUTION INTRAVENOUS at 05:44

## 2022-01-01 RX ADMIN — DEXAMETHASONE SODIUM PHOSPHATE 4 MG: 4 INJECTION, SOLUTION INTRA-ARTICULAR; INTRALESIONAL; INTRAMUSCULAR; INTRAVENOUS; SOFT TISSUE at 11:56

## 2022-01-01 RX ADMIN — DOCUSATE SODIUM 100 MG: 100 CAPSULE, LIQUID FILLED ORAL at 17:13

## 2022-01-01 RX ADMIN — DEXAMETHASONE SODIUM PHOSPHATE 4 MG: 4 INJECTION, SOLUTION INTRA-ARTICULAR; INTRALESIONAL; INTRAMUSCULAR; INTRAVENOUS; SOFT TISSUE at 17:54

## 2022-01-01 RX ADMIN — AMLODIPINE BESYLATE 5 MG: 10 TABLET ORAL at 10:39

## 2022-01-01 RX ADMIN — FAMOTIDINE 20 MG: 10 INJECTION, SOLUTION INTRAVENOUS at 06:26

## 2022-01-01 RX ADMIN — FAMOTIDINE 20 MG: 10 INJECTION, SOLUTION INTRAVENOUS at 17:54

## 2022-01-01 RX ADMIN — DIBASIC SODIUM PHOSPHATE, MONOBASIC POTASSIUM PHOSPHATE AND MONOBASIC SODIUM PHOSPHATE 250 MG: 852; 155; 130 TABLET ORAL at 09:31

## 2022-01-01 RX ADMIN — HALOPERIDOL LACTATE 5 MG: 5 INJECTION, SOLUTION INTRAMUSCULAR at 03:43

## 2022-01-01 RX ADMIN — DEXAMETHASONE SODIUM PHOSPHATE 4 MG: 4 INJECTION, SOLUTION INTRA-ARTICULAR; INTRALESIONAL; INTRAMUSCULAR; INTRAVENOUS; SOFT TISSUE at 05:03

## 2022-01-01 RX ADMIN — OMEPRAZOLE 20 MG: 20 CAPSULE, DELAYED RELEASE ORAL at 19:44

## 2022-01-01 RX ADMIN — DEXAMETHASONE 2 MG: 4 TABLET ORAL at 21:35

## 2022-01-01 RX ADMIN — DEXAMETHASONE SODIUM PHOSPHATE 4 MG: 4 INJECTION, SOLUTION INTRA-ARTICULAR; INTRALESIONAL; INTRAMUSCULAR; INTRAVENOUS; SOFT TISSUE at 01:48

## 2022-01-01 RX ADMIN — DEXAMETHASONE SODIUM PHOSPHATE 4 MG: 4 INJECTION, SOLUTION INTRA-ARTICULAR; INTRALESIONAL; INTRAMUSCULAR; INTRAVENOUS; SOFT TISSUE at 00:26

## 2022-01-01 RX ADMIN — LEVETIRACETAM 500 MG: 100 INJECTION, SOLUTION INTRAVENOUS at 17:06

## 2022-01-01 RX ADMIN — SODIUM CHLORIDE: 9 INJECTION, SOLUTION INTRAVENOUS at 03:03

## 2022-01-01 RX ADMIN — DEXAMETHASONE 2 MG: 4 TABLET ORAL at 09:31

## 2022-01-01 RX ADMIN — OMEPRAZOLE 40 MG: KIT at 17:12

## 2022-01-01 RX ADMIN — DEXAMETHASONE SODIUM PHOSPHATE 4 MG: 4 INJECTION, SOLUTION INTRA-ARTICULAR; INTRALESIONAL; INTRAMUSCULAR; INTRAVENOUS; SOFT TISSUE at 05:44

## 2022-01-01 RX ADMIN — RIFAXIMIN 550 MG: 550 TABLET ORAL at 17:54

## 2022-01-01 RX ADMIN — SENNOSIDES AND DOCUSATE SODIUM 2 TABLET: 50; 8.6 TABLET ORAL at 17:54

## 2022-01-01 RX ADMIN — DIAZEPAM 2.5 MG: 5 INJECTION, SOLUTION INTRAMUSCULAR; INTRAVENOUS at 00:30

## 2022-01-01 RX ADMIN — SODIUM CHLORIDE: 9 INJECTION, SOLUTION INTRAVENOUS at 00:18

## 2022-01-01 RX ADMIN — CEFTRIAXONE SODIUM 1 G: 1 INJECTION, POWDER, FOR SOLUTION INTRAMUSCULAR; INTRAVENOUS at 23:28

## 2022-01-01 RX ADMIN — LACTULOSE 45 ML: 20 SOLUTION ORAL at 05:44

## 2022-01-01 RX ADMIN — LEVETIRACETAM 500 MG: 500 TABLET, FILM COATED ORAL at 17:24

## 2022-01-01 RX ADMIN — POTASSIUM PHOSPHATE, MONOBASIC AND POTASSIUM PHOSPHATE, DIBASIC 15 MMOL: 224; 236 INJECTION, SOLUTION, CONCENTRATE INTRAVENOUS at 10:40

## 2022-01-01 RX ADMIN — LEVETIRACETAM 500 MG: 500 TABLET, FILM COATED ORAL at 17:13

## 2022-01-01 RX ADMIN — DEXAMETHASONE SODIUM PHOSPHATE 4 MG: 4 INJECTION, SOLUTION INTRA-ARTICULAR; INTRALESIONAL; INTRAMUSCULAR; INTRAVENOUS; SOFT TISSUE at 17:06

## 2022-01-01 RX ADMIN — DEXAMETHASONE SODIUM PHOSPHATE 4 MG: 4 INJECTION, SOLUTION INTRA-ARTICULAR; INTRALESIONAL; INTRAMUSCULAR; INTRAVENOUS; SOFT TISSUE at 15:12

## 2022-01-01 RX ADMIN — LACTULOSE 45 ML: 20 SOLUTION ORAL at 17:54

## 2022-01-01 RX ADMIN — FAMOTIDINE 20 MG: 10 INJECTION, SOLUTION INTRAVENOUS at 05:03

## 2022-01-01 RX ADMIN — RIFAXIMIN 550 MG: 550 TABLET ORAL at 05:44

## 2022-01-01 RX ADMIN — DEXAMETHASONE SODIUM PHOSPHATE 4 MG: 4 INJECTION, SOLUTION INTRA-ARTICULAR; INTRALESIONAL; INTRAMUSCULAR; INTRAVENOUS; SOFT TISSUE at 04:24

## 2022-01-01 RX ADMIN — FAMOTIDINE 20 MG: 10 INJECTION, SOLUTION INTRAVENOUS at 04:24

## 2022-01-01 RX ADMIN — SODIUM CHLORIDE: 9 INJECTION, SOLUTION INTRAVENOUS at 14:19

## 2022-01-01 RX ADMIN — LEVETIRACETAM 500 MG: 100 INJECTION, SOLUTION INTRAVENOUS at 06:26

## 2022-01-01 RX ADMIN — LORAZEPAM 1 MG: 2 CONCENTRATE ORAL at 13:19

## 2022-01-01 RX ADMIN — DEXAMETHASONE 2 MG: 4 TABLET ORAL at 17:24

## 2022-01-01 RX ADMIN — LEVETIRACETAM 500 MG: 100 INJECTION, SOLUTION INTRAVENOUS at 05:44

## 2022-01-01 RX ADMIN — LACTULOSE 300 ML: 10 SOLUTION ORAL; RECTAL at 18:00

## 2022-01-01 RX ADMIN — FAMOTIDINE 20 MG: 10 INJECTION, SOLUTION INTRAVENOUS at 18:02

## 2022-01-01 RX ADMIN — LEVETIRACETAM 500 MG: 100 INJECTION, SOLUTION INTRAVENOUS at 18:32

## 2022-01-01 RX ADMIN — MAGNESIUM SULFATE HEPTAHYDRATE 2 G: 40 INJECTION, SOLUTION INTRAVENOUS at 10:40

## 2022-01-01 RX ADMIN — HALOPERIDOL LACTATE 5 MG: 5 INJECTION, SOLUTION INTRAMUSCULAR at 05:19

## 2022-01-01 RX ADMIN — LEVETIRACETAM 500 MG: 100 INJECTION, SOLUTION INTRAVENOUS at 06:27

## 2022-01-01 RX ADMIN — RIFAXIMIN 550 MG: 550 TABLET ORAL at 06:37

## 2022-01-01 RX ADMIN — SODIUM CHLORIDE: 9 INJECTION, SOLUTION INTRAVENOUS at 12:28

## 2022-01-01 RX ADMIN — LACTULOSE 45 ML: 20 SOLUTION ORAL at 11:59

## 2022-01-01 RX ADMIN — SODIUM CHLORIDE: 9 INJECTION, SOLUTION INTRAVENOUS at 20:33

## 2022-01-01 RX ADMIN — SODIUM CHLORIDE: 9 INJECTION, SOLUTION INTRAVENOUS at 01:54

## 2022-01-01 ASSESSMENT — SOCIAL DETERMINANTS OF HEALTH (SDOH)
ACTIVE STRESSOR - HEALTH CHANGES: 1

## 2022-01-01 ASSESSMENT — LIFESTYLE VARIABLES
EVER HAD A DRINK FIRST THING IN THE MORNING TO STEADY YOUR NERVES TO GET RID OF A HANGOVER: NO
HAVE PEOPLE ANNOYED YOU BY CRITICIZING YOUR DRINKING: NO
TOTAL SCORE: 0
ON A TYPICAL DAY WHEN YOU DRINK ALCOHOL HOW MANY DRINKS DO YOU HAVE: 0
ALCOHOL_USE: NO
AVERAGE NUMBER OF DAYS PER WEEK YOU HAVE A DRINK CONTAINING ALCOHOL: 0
CONSUMPTION TOTAL: NEGATIVE
HAVE YOU EVER FELT YOU SHOULD CUT DOWN ON YOUR DRINKING: NO
HOW MANY TIMES IN THE PAST YEAR HAVE YOU HAD 5 OR MORE DRINKS IN A DAY: 0
TOTAL SCORE: 0
EVER FELT BAD OR GUILTY ABOUT YOUR DRINKING: NO
TOTAL SCORE: 0

## 2022-01-01 ASSESSMENT — ENCOUNTER SYMPTOMS
STOOL FREQUENCY: DAILY
LOWER EXTREMITY EDEMA: 1
LAST BOWEL MOVEMENT: 66387
STOOL FREQUENCY: DAILY
PERSON REPORTING PAIN: PATIENT
BOWEL INCONTINENCE: 1
PERSON REPORTING PAIN: PATIENT
FATIGUES EASILY: 1
SHORTNESS OF BREATH: 1
UNABLE TO COMMUNICATE PAIN: 1
UNABLE TO COMMUNICATE PAIN: 1
STOOL FREQUENCY: DAILY
UNABLE TO COMMUNICATE PAIN: 1
PERSON REPORTING PAIN: PATIENT
PERSON REPORTING PAIN: PATIENT
LAST BOWEL MOVEMENT: 66388
STOOL FREQUENCY: DAILY
BOWEL INCONTINENCE: 1
BOWEL INCONTINENCE: 1
LAST BOWEL MOVEMENT: 66395
LAST BOWEL MOVEMENT: 66400
PERSON REPORTING PAIN: PATIENT
CHANGE IN LEVEL OF CONSCIOUSNESS: 1
UNABLE TO COMMUNICATE PAIN: 1
FATIGUES EASILY: 1
DENIES PAIN: 1
BOWEL INCONTINENCE: 1
DENIES PAIN: 1

## 2022-01-01 ASSESSMENT — ACTIVITIES OF DAILY LIVING (ADL)
MONEY MANAGEMENT (EXPENSES/BILLS): TOTALLY DEPENDENT
CURRENT_FUNCTION: TWO PERSON
AMBULATION_REQUIRES_ASSISTANCE: 1
CURRENT_FUNCTION: TWO PERSON
MONEY MANAGEMENT (EXPENSES/BILLS): TOTALLY DEPENDENT
EATING_REQUIRES_ASSISTANCE: 1
BATHING_REQUIRES_ASSISTANCE: 1
EATING_REQUIRES_ASSISTANCE: 1
AMBULATION_REQUIRES_ASSISTANCE: 1
DRESSING_REQUIRES_ASSISTANCE: 1
PHYSICAL_TRANSFER_REQUIRES_ASSISTANCE: 1
PHYSICAL_TRANSFER_REQUIRES_ASSISTANCE: 1
CONTINENCE_REQUIRES_ASSISTANCE: 1
BATHING_REQUIRES_ASSISTANCE: 1
MONEY MANAGEMENT (EXPENSES/BILLS): TOTALLY DEPENDENT
CONTINENCE_REQUIRES_ASSISTANCE: 1
DRESSING_REQUIRES_ASSISTANCE: 1

## 2022-01-01 ASSESSMENT — COPD QUESTIONNAIRES
DO YOU EVER COUGH UP ANY MUCUS OR PHLEGM?: NO/ONLY WITH OCCASIONAL COLDS OR INFECTIONS
COPD SCREENING SCORE: 4
HAVE YOU SMOKED AT LEAST 100 CIGARETTES IN YOUR ENTIRE LIFE: YES
DURING THE PAST 4 WEEKS HOW MUCH DID YOU FEEL SHORT OF BREATH: NONE/LITTLE OF THE TIME

## 2022-01-01 ASSESSMENT — PAIN DESCRIPTION - PAIN TYPE
TYPE: ACUTE PAIN

## 2022-01-01 ASSESSMENT — COGNITIVE AND FUNCTIONAL STATUS - GENERAL
STANDING UP FROM CHAIR USING ARMS: A LOT
EATING MEALS: TOTAL
TOILETING: A LOT
DRESSING REGULAR UPPER BODY CLOTHING: TOTAL
HELP NEEDED FOR BATHING: A LOT
CLIMB 3 TO 5 STEPS WITH RAILING: TOTAL
HELP NEEDED FOR BATHING: A LOT
MOVING FROM LYING ON BACK TO SITTING ON SIDE OF FLAT BED: A LOT
HELP NEEDED FOR BATHING: TOTAL
MOBILITY SCORE: 12
MOBILITY SCORE: 12
DAILY ACTIVITIY SCORE: 11
DAILY ACTIVITIY SCORE: 12
EATING MEALS: TOTAL
TURNING FROM BACK TO SIDE WHILE IN FLAT BAD: UNABLE
MOVING FROM LYING ON BACK TO SITTING ON SIDE OF FLAT BED: UNABLE
SUGGESTED CMS G CODE MODIFIER DAILY ACTIVITY: CN
STANDING UP FROM CHAIR USING ARMS: TOTAL
PERSONAL GROOMING: A LOT
PERSONAL GROOMING: A LOT
DRESSING REGULAR LOWER BODY CLOTHING: A LOT
DRESSING REGULAR LOWER BODY CLOTHING: TOTAL
EATING MEALS: A LOT
SUGGESTED CMS G CODE MODIFIER MOBILITY: CL
WALKING IN HOSPITAL ROOM: TOTAL
DRESSING REGULAR UPPER BODY CLOTHING: A LOT
TOILETING: A LOT
MOVING TO AND FROM BED TO CHAIR: A LOT
DAILY ACTIVITIY SCORE: 6
MOVING FROM LYING ON BACK TO SITTING ON SIDE OF FLAT BED: A LOT
CLIMB 3 TO 5 STEPS WITH RAILING: TOTAL
SUGGESTED CMS G CODE MODIFIER DAILY ACTIVITY: CL
SUGGESTED CMS G CODE MODIFIER MOBILITY: CN
SUGGESTED CMS G CODE MODIFIER DAILY ACTIVITY: CL
WALKING IN HOSPITAL ROOM: A LOT
MOBILITY SCORE: 6
MOVING TO AND FROM BED TO CHAIR: UNABLE
TURNING FROM BACK TO SIDE WHILE IN FLAT BAD: A LITTLE
CLIMB 3 TO 5 STEPS WITH RAILING: TOTAL
WALKING IN HOSPITAL ROOM: A LOT
DRESSING REGULAR LOWER BODY CLOTHING: A LOT
TOILETING: TOTAL
TURNING FROM BACK TO SIDE WHILE IN FLAT BAD: A LITTLE
SUGGESTED CMS G CODE MODIFIER MOBILITY: CL
DRESSING REGULAR UPPER BODY CLOTHING: A LOT
STANDING UP FROM CHAIR USING ARMS: A LOT
PERSONAL GROOMING: TOTAL
MOVING TO AND FROM BED TO CHAIR: A LOT

## 2022-01-01 ASSESSMENT — PAIN SCALES - PAIN ASSESSMENT IN ADVANCED DEMENTIA (PAINAD)
CONSOLABILITY: 0 - NO NEED TO CONSOLE.
CONSOLABILITY: 0 - NO NEED TO CONSOLE.
NEGVOCALIZATION: 0 - NONE.
CONSOLABILITY: 0 - NO NEED TO CONSOLE.
TOTALSCORE: 0
COMMENTS: 5/10
FACIALEXPRESSION: 0 - SMILING OR INEXPRESSIVE.
TOTALSCORE: 0
CONSOLABILITY: 1 - DISTRACTED OR REASSURED BY VOICE OR TOUCH.
TOTALSCORE: 0
FACIALEXPRESSION: 0 - SMILING OR INEXPRESSIVE.
BODYLANGUAGE: 1 - TENSE. DISTRESSED PACING. FIDGETING.
BODYLANGUAGE: 0 - RELAXED.
CONSOLABILITY: 0 - NO NEED TO CONSOLE.
TOTALSCORE: 0
FACIALEXPRESSION: 2 - FACIAL GRIMACING.
FACIALEXPRESSION: 0 - SMILING OR INEXPRESSIVE.
NEGVOCALIZATION: 1 - OCCASIONAL MOAN OR GROAN. LOW-LEVEL SPEECH WITH A NEGATIVE OR DISAPPROVING QUALITY.
BODYLANGUAGE: 0 - RELAXED.
NEGVOCALIZATION: 0 - NONE.
NEGVOCALIZATION: 0 - NONE.
BODYLANGUAGE: 0 - RELAXED.
TOTALSCORE: 5
NEGVOCALIZATION: 0 - NONE.
BODYLANGUAGE: 0 - RELAXED.
FACIALEXPRESSION: 0 - SMILING OR INEXPRESSIVE.
BODYLANGUAGE: 1 - TENSE. DISTRESSED PACING. FIDGETING.
TOTALSCORE: 2
CONSOLABILITY: 1 - DISTRACTED OR REASSURED BY VOICE OR TOUCH.
FACIALEXPRESSION: 0 - SMILING OR INEXPRESSIVE.
NEGVOCALIZATION: 0 - NONE.

## 2022-01-01 ASSESSMENT — PATIENT HEALTH QUESTIONNAIRE - PHQ9: CLINICAL INTERPRETATION OF PHQ2 SCORE: 0

## 2022-01-01 ASSESSMENT — GAIT ASSESSMENTS: GAIT LEVEL OF ASSIST: UNABLE TO PARTICIPATE

## 2022-01-01 ASSESSMENT — FIBROSIS 4 INDEX
FIB4 SCORE: 12.48
FIB4 SCORE: 10.14

## 2022-09-25 PROBLEM — S06.5XAA TRAUMATIC SUBDURAL HEMATOMA, INITIAL ENCOUNTER (HCC): Status: ACTIVE | Noted: 2022-01-01

## 2022-09-25 PROBLEM — Z53.09 CONTRAINDICATION TO DEEP VEIN THROMBOSIS (DVT) PROPHYLAXIS: Status: ACTIVE | Noted: 2022-01-01

## 2022-09-25 PROBLEM — Z86.59 HISTORY OF DEMENTIA: Status: ACTIVE | Noted: 2022-01-01

## 2022-09-25 PROBLEM — D69.6 THROMBOCYTOPENIA (HCC): Status: ACTIVE | Noted: 2022-01-01

## 2022-09-25 PROBLEM — T14.90XA TRAUMA: Status: ACTIVE | Noted: 2022-01-01

## 2022-09-25 NOTE — ED TRIAGE NOTES
Chief Complaint   Patient presents with    ALOC     Last known well 1hr prior to arrival. Pt was at home with family when he became more lethargic and had to sit down.  EMS called. On arrival BP 80/50.  Pt given 400ml fluid by EMS which improved BP to 120s.  Glood sugar for .  Pt has history of dementia so baseline is to be A&Ox2-3.      Pt primarily Korean speaking. Pt still lethargic, able to respond to voice. A&O x1. Pt placed on monitor. Blood drawn and sent to lab.

## 2022-09-26 PROBLEM — S06.5XAA SUBDURAL HEMATOMA (HCC): Status: ACTIVE | Noted: 2022-01-01

## 2022-09-26 PROBLEM — E83.39 HYPOPHOSPHATEMIA: Status: ACTIVE | Noted: 2022-01-01

## 2022-09-26 NOTE — PROGRESS NOTES
4 Eyes Skin Assessment Completed by GIAN Paz and Enma Lopez RN, RN.  Wt 59.9 kg  T- 97.1 F   Head WDL  Ears WDL  Nose WDL  Mouth WDL  Neck WDL  Breast/Chest WDL  Shoulder Blades WDL  Spine WDL  (R) Arm/Elbow/Hand - Bruising  (L) Arm/Elbow/Hand - Bruising  Abdomen WDL  Groin WDL  Scrotum/Coccyx/Buttocks skin tear right buttock  (R) Leg WDL - scarring  (L) Leg WDL - scarring  (R) Heel/Foot/Toe dusky, flaky skin, bruising/redness bottom of foot  (L) Heel/Foot/Toe abrasion, left lateral foot, bruising/redness bottom of foot          Devices In Places ECG, Blood Pressure Cuff, Pulse Ox, and Condom Cath      Interventions In Place N/A    Possible Skin Injury No    Pictures Uploaded Into Epic yes  Wound Consult Placed N/A  RN Wound Prevention Protocol Ordered Yes

## 2022-09-26 NOTE — ASSESSMENT & PLAN NOTE
Prophylactic anticoagulation for thrombotic prevention initially contraindicated secondary to elevated bleeding risk.  9/26 Trauma surveillance venous duplex scanning ordered.

## 2022-09-26 NOTE — PROGRESS NOTES
"Neurosurgery Progress Note    Subjective:  No acute events overnight.   Pt awake, restless. Mauritanian speaking only. No family at bedside    Exam:  Awake. Mauritanian speaking only.   Does not appear to be in pain or distress  Does not answer when asked his name, responds with \"owww\". Denies pain when asked in Malawian  2mm PERRL, EOMI.   LAGOS. Does not follow commands  Sensation: Intact throughout.       BP  Min: 107/49  Max: 160/71  Pulse  Av.3  Min: 55  Max: 94  Resp  Av.6  Min: 11  Max: 57  Temp  Av.5 °C (97.7 °F)  Min: 35.9 °C (96.7 °F)  Max: 37.1 °C (98.8 °F)  SpO2  Av.7 %  Min: 88 %  Max: 99 %    No data recorded    Recent Labs     22  0503   WBC 4.8 4.3*   RBC 3.75* 2.89*   HEMOGLOBIN 13.0* 10.2*   HEMATOCRIT 38.4* 29.5*   .4* 102.1*   MCH 34.7* 35.3*   MCHC 33.9 34.6   RDW 63.3* 63.3*   PLATELETCT 76* 84*   MPV 12.3 11.5     Recent Labs     22  0503   SODIUM 139 141   POTASSIUM 3.6 3.6   CHLORIDE 107 111   CO2 22 24   GLUCOSE 154* 100*   BUN 9 9   CREATININE 0.52 0.37*   CALCIUM 8.7 8.5     Recent Labs     22  2134   INR 2.56*     Recent Labs     22  21322  0128   REACTMIN 5.2 3.5*   CLOTKINET 2.8* 1.8   CLOTANGL 67.0 71.0   MAXCLOTS <40.0* 43.7*   [id number]  --  0.0   PRCINADP see comment 63.1*   PRCINAA see comment 35.6*       Intake/Output                         22 0700 - 22 0659 22 07 - 22 0659     [id number] Total [id number] Total                 Intake    P.O.  --  -- --  0  -- 0    P.O. -- -- -- 0 -- 0    I.V.  --  696.3 696.3  --  -- --    Volume (mL) (NS infusion) -- 696.3 696.3 -- -- --    Blood  --  631 631  --  -- --    Volume (RELEASE PLATELET PHERESIS) -- 233 233 -- -- --    Volume (RELEASE FRESH FROZEN PLASMA (UNITS)) -- 203 203 -- -- --    Volume (RELEASE FRESH FROZEN PLASMA (UNITS)) -- 195 195 -- -- --    Total Intake -- 1327.3 1327.3 0 -- 0       Output    Urine  --  " 300 300  --  -- --    Number of Times Voided -- 1 x 1 x -- -- --    Urine Void (mL) -- 300 300 -- -- --    Stool  --  -- --  --  -- --    Number of Times Stooled -- -- -- 0 x -- 0 x    Total Output -- 300 300 -- -- --       Net I/O     -- 1027.3 1027.3 0 -- 0              Intake/Output Summary (Last 24 hours) at 9/26/2022 1248  Last data filed at 9/26/2022 1000  Gross per 24 hour   Intake 1327.25 ml   Output 300 ml   Net 1027.25 ml             potassium phosphate IVPB (CUSTOM)  15 mmol Once    Respiratory Therapy Consult   Continuous RT    Pharmacy Consult Request  1 Each PHARMACY TO DOSE    ondansetron  4 mg Q4HRS PRN    ondansetron  4 mg Q4HRS PRN    docusate sodium  100 mg BID    senna-docusate  1 Tablet Nightly    senna-docusate  1 Tablet Q24HRS PRN    polyethylene glycol/lytes  1 Packet BID    magnesium hydroxide  30 mL DAILY    bisacodyl  10 mg Q24HRS PRN    sodium phosphate  1 Each Once PRN    NS   Continuous    morphine injection  2 mg Q HOUR PRN    Or    morphine injection  4 mg Q HOUR PRN    levETIRAcetam  500 mg Q12HRS    Or    levETIRAcetam (Keppra) IV  500 mg Q12HRS       Assessment and Plan:  Hospital day #1 right subacute SDH with minimal midline shift   POD #n/a  Prophylactic anticoagulation: no         Start date/time: TBD    Plan:  Dr Reece to discuss with family draining SDH sometime this week  Follow up Head CT stable

## 2022-09-26 NOTE — CONSULTS
Hospital Medicine Consultation    Date of Service  9/26/2022    Referring Physician  Jenny Umana M.D.    Consulting Physician  Mohsen Perez D.O.    Reason for Consultation  Assuming medical management    History of Presenting Illness  87 y.o. male who presented 9/25/2022 with a previous medical history that includes dementia, GERD, and hypertension.    Patient was admitted by the trauma service on September 25.  He had been having worsening mental status and difficulty walking for approximately 4 days prior to his presentation.  CT done in the ED demonstrated a 1.7cm subdural hematoma in the right hemisphere consistent with BIG 3.  He was admitted to the ICU with consultation from neurosurgery.    Review of systems and HPI is limited on my exam due to the patient's advanced dementia.  His daughter is at the bedside, she reports that he is behaving as he would normally.      Review of Systems  Review of Systems   Unable to perform ROS: Dementia     Past Medical History   has a past medical history of Arthritis, CATARACT, Dementia ([facility]), Depression (08/03/2012), DJD (degenerative joint disease) (04/22/2010), GERD (gastroesophageal reflux disease) (08/03/2012), H. pylori infection (08/03/2012), Heart attack (HCC) (01/01/2001), Hypertension, Impaired fasting glucose (08/03/2012), and SunDown syndrome.    Surgical History   has a past surgical history that includes cataract extraction with iol.    Family History  family history includes Diabetes in his mother; Hyperlipidemia in his mother and another family member.    Social History   reports that he quit smoking about 18 years ago. His smoking use included cigarettes. He has a 25.00 pack-year smoking history. He has never used smokeless tobacco. He reports that he does not currently use alcohol. He reports that he does not use drugs.    Medications  None       Allergies  Allergies   Allergen Reactions    Pcn [Penicillins] Rash and Nausea     .> 70 years  ago       Physical Exam  Temp:  [35.9 °C (96.7 °F)-37.1 °C (98.8 °F)] 36.8 °C (98.2 °F)  Pulse:  [55-94] 76  Resp:  [11-57] 25  BP: (107-162)/(46-87) 152/69  SpO2:  [88 %-99 %] 97 %    Physical Exam  Constitutional:       General: He is not in acute distress.     Appearance: He is well-developed. He is not diaphoretic.   HENT:      Head: Normocephalic and atraumatic.   Eyes:      Conjunctiva/sclera: Conjunctivae normal.   Neck:      Vascular: No JVD.   Cardiovascular:      Rate and Rhythm: Normal rate.      Heart sounds: Murmur heard.     No gallop.   Pulmonary:      Effort: Pulmonary effort is normal. No respiratory distress.      Breath sounds: No stridor. No wheezing or rales.   Abdominal:      Palpations: Abdomen is soft.      Tenderness: There is no abdominal tenderness. There is no guarding or rebound.   Musculoskeletal:         General: No tenderness.      Right lower leg: No edema.      Left lower leg: No edema.   Skin:     General: Skin is warm and dry.      Capillary Refill: Capillary refill takes less than 2 seconds.      Findings: No rash.   Neurological:      General: No focal deficit present.      Comments: Patient is alert and oriented to self only  He attends conversation and follows commands  Responses to questions are limited and usually 1 word at a time and mostly inappropriate  Per the patient's daughter he is functioning at his baseline       Fluids  Date 09/26/22 0700 - 09/27/22 0659   Shift 4235-7928 2046-3912 9570-4857 24 Hour Total   INTAKE   P.O. 300   300   I.V. 633.3   633.3   IV Piggyback 111.1   111.1   Shift Total 1044.4   1044.4   OUTPUT   Shift Total       Weight (kg) 59.9 59.9 59.9 59.9       Laboratory  Recent Labs     09/25/22  1659 09/26/22  0503   WBC 4.8 4.3*   RBC 3.75* 2.89*   HEMOGLOBIN 13.0* 10.2*   HEMATOCRIT 38.4* 29.5*   .4* 102.1*   MCH 34.7* 35.3*   MCHC 33.9 34.6   RDW 63.3* 63.3*   PLATELETCT 76* 84*   MPV 12.3 11.5     Recent Labs     09/25/22  7729  09/26/22  0503   SODIUM 139 141   POTASSIUM 3.6 3.6   CHLORIDE 107 111   CO2 22 24   GLUCOSE 154* 100*   BUN 9 9   CREATININE 0.52 0.37*   CALCIUM 8.7 8.5     Recent Labs     09/25/22  2134   INR 2.56*                 Imaging  CT-HEAD W/O   Final Result         1.  Right holohemispheric acute subdural hematoma, similar compared to prior study given difference of technique   2.  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   3.  Atherosclerosis.         CT-HEAD W/O   Final Result      1. Hyperdense hemispheric right subdural hematoma measuring 1.7 cm in thickness with localized mass effect and 2 mm leftward midline shift.   2. 1.2 cm thick mixed density left frontoparietal convexity subdural hematoma.   3. Chronic microvascular ischemic type changes.      Based solely on CT findings, the brain injury guideline category is mBIG 3.      EDH   IVH   Displaced skull fx   SDH > 8mm   IPH > 8mm or multiple   SAH bi-hemispheric or > 3mm      The original BIG retrospective analysis found radiographic progression in 0% of BIG 1 patients and 2.6% BIG 2.      These findings were discussed with YAMILA LAURENT on 9/25/2022 6:57 PM.      DX-CHEST-PORTABLE (1 VIEW)   Final Result      Mild interstitial prominence which is nonspecific but possibly represents vascular congestion.          Assessment/Plan  * Traumatic subdural hematoma, initial encounter (HCC)- (present on admission)  Assessment & Plan  1.7 cm  BIG 3  Neurosurgery following  Mechanical DVT prophylaxis  BP control  Follow serial neuro exams  Neurosurgery is in discussion with the family and consideration of surgical drainage versus nonoperative management  Follow BMP  Patient's neurologic exam is a little difficult to interpret given his advanced dementia but per his daughter who is at the bedside on my examination, he is currently at his  baseline    Hypophosphatemia- (present on admission)  Assessment & Plan  Following replace    Thrombocytopenia (HCC)- (present on admission)  Assessment & Plan  Chronic going back at least to 2019 in our records  Currently in the 80s  Neurosurgery aware  May need transfusion if goes to the OR  Given chronicity, do not think acute evaluation is needed    History of dementia- (present on admission)  Assessment & Plan  Advanced dementia and normally oriented to self only  Has been on Seroquel in the past for behavioral outbursts, but was oversedated on that medication and that has since been stopped    GERD (gastroesophageal reflux disease)- (present on admission)  Assessment & Plan  PPI    Hypertension- (present on admission)  Assessment & Plan  Patient is on no medications at home  Running 140s to 150s  Start amlodipine  IV as needed's for breakthrough over 160

## 2022-09-26 NOTE — CARE PLAN
Problem: Knowledge Deficit - Standard  Goal: Patient and family/care givers will demonstrate understanding of plan of care, disease process/condition, diagnostic tests and medications  Outcome: Not Progressing     Problem: Skin Integrity  Goal: Skin integrity is maintained or improved  Outcome: Progressing     Problem: Fall Risk  Goal: Patient will remain free from falls  Outcome: Progressing     Problem: Pain - Standard  Goal: Alleviation of pain or a reduction in pain to the patient’s comfort goal  Outcome: Progressing   The patient is Stable - Low risk of patient condition declining or worsening    Shift Goals  Clinical Goals: stable neuro exam, stable vitals  Patient Goals: comfort  Family Goals: no family present    Progress made toward(s) clinical / shift goals:  Patient is unable to express understanding of care plan, family understands care plan. Patient skin integrity is maintained, patient has remained free from falls during hospital stay, patient reports no pain when asked in Portuguese. (Patient is Portuguese speaking)    Patient is not progressing towards the following goals:      Problem: Knowledge Deficit - Standard  Goal: Patient and family/care givers will demonstrate understanding of plan of care, disease process/condition, diagnostic tests and medications  Outcome: Not Progressing

## 2022-09-26 NOTE — PROGRESS NOTES
Trauma / Surgical Daily Progress Note    Date of Service  9/26/2022    Chief Complaint  87 y.o. male admitted 9/25/2022 with subdural hematoma and altered level of consciousness after a remote fall approximately 4 days ago.    Interval Events  Tertiary survey complete. SBIRT complete per family. Medication reconciliation per family.  Unable to identify any new injuries d/t mental status. A/O x 2-3.  Denies pain.  Tolerating clear liquid diet.  Supplemental oxygen 1-3L NC. Unable to do IS.    - Okay to transfer to [facility].  - Swallow evaluation ordered for advancement of diet.  - Mobilize.  - Goal to wean oxygen off.  - Possible surgery this week with Neurosurgery.    Appreciate the Medicine team assuming care of patient. Trauma signing off.    Review of Systems  Review of Systems   Unable to perform ROS: Mental acuity      Vital Signs  Temp:  [35.9 °C (96.7 °F)-37.1 °C (98.8 °F)] 36.8 °C (98.2 °F)  Pulse:  [55-94] 76  Resp:  [11-57] 25  BP: (107-162)/(46-87) 152/69  SpO2:  [88 %-99 %] 97 %    Physical Exam  Physical Exam  Vitals and nursing note reviewed. Exam conducted with a chaperone present (Family at bedside.).   Constitutional:       General: He is awake. He is not in acute distress.     Appearance: Normal appearance. He is not ill-appearing or toxic-appearing.      Interventions: Nasal cannula in place.      Comments: Cameroonian speaking   HENT:      Head: Normocephalic and atraumatic.      Right Ear: External ear normal.      Left Ear: External ear normal.      Nose: Nose normal. No congestion.      Mouth/Throat:      Mouth: Mucous membranes are dry.      Pharynx: Oropharynx is clear.   Eyes:      General: Scleral icterus present.         Right eye: No discharge.         Left eye: No discharge.      Extraocular Movements: Extraocular movements intact.      Pupils: Pupils are equal, round, and reactive to light.   Cardiovascular:      Rate and Rhythm: Normal rate and regular rhythm.      Pulses: Normal pulses.       Heart sounds: Normal heart sounds. No murmur heard.  Pulmonary:      Effort: Pulmonary effort is normal. No respiratory distress.      Breath sounds: Examination of the right-middle field reveals decreased breath sounds. Examination of the left-middle field reveals decreased breath sounds. Examination of the right-lower field reveals decreased breath sounds. Examination of the left-lower field reveals decreased breath sounds. Decreased breath sounds present. No wheezing.   Abdominal:      General: Bowel sounds are normal. There is no distension.      Palpations: Abdomen is soft. There is no mass.      Tenderness: There is no abdominal tenderness. There is no guarding.   Genitourinary:     Comments: Condom cath  Musculoskeletal:         General: No swelling, tenderness or deformity. Normal range of motion.      Cervical back: Normal range of motion and neck supple. No tenderness.      Comments: Patient does not grimace with spine palpation.   Full ROM x 4 extremities. Follows direction in Bermudian   Skin:     General: Skin is warm and dry.      Findings: Bruising (extensive to left hand, scattered throughout) present.   Neurological:      Mental Status: He is alert. Mental status is at baseline.      GCS: GCS eye subscore is 4. GCS verbal subscore is 4. GCS motor subscore is 6.      Motor: Motor function is intact.   Psychiatric:         Mood and Affect: Affect is flat.         Speech: Speech is delayed.         Behavior: Behavior is cooperative.         Cognition and Memory: Cognition is impaired.       Laboratory  Recent Results (from the past 24 hour(s))   CBC WITH DIFFERENTIAL    Collection Time: 09/25/22  4:59 PM   Result Value Ref Range    WBC 4.8 4.8 - 10.8 K/uL    RBC 3.75 (L) 4.70 - 6.10 M/uL    Hemoglobin 13.0 (L) 14.0 - 18.0 g/dL    Hematocrit 38.4 (L) 42.0 - 52.0 %    .4 (H) 81.4 - 97.8 fL    MCH 34.7 (H) 27.0 - 33.0 pg    MCHC 33.9 33.7 - 35.3 g/dL    RDW 63.3 (H) 35.9 - 50.0 fL    Platelet Count  76 (L) 164 - 446 K/uL    MPV 12.3 9.0 - 12.9 fL    Neutrophils-Polys 49.40 44.00 - 72.00 %    Lymphocytes 35.70 22.00 - 41.00 %    Monocytes 10.50 0.00 - 13.40 %    Eosinophils 2.70 0.00 - 6.90 %    Basophils 1.30 0.00 - 1.80 %    Immature Granulocytes 0.40 0.00 - 0.90 %    Nucleated RBC 0.00 /100 WBC    Neutrophils (Absolute) 2.35 1.82 - 7.42 K/uL    Lymphs (Absolute) 1.70 1.00 - 4.80 K/uL    Monos (Absolute) 0.50 0.00 - 0.85 K/uL    Eos (Absolute) 0.13 0.00 - 0.51 K/uL    Baso (Absolute) 0.06 0.00 - 0.12 K/uL    Immature Granulocytes (abs) 0.02 0.00 - 0.11 K/uL    NRBC (Absolute) 0.00 K/uL   COMP METABOLIC PANEL    Collection Time: 09/25/22  4:59 PM   Result Value Ref Range    Sodium 139 135 - 145 mmol/L    Potassium 3.6 3.6 - 5.5 mmol/L    Chloride 107 96 - 112 mmol/L    Co2 22 20 - 33 mmol/L    Anion Gap 10.0 7.0 - 16.0    Glucose 154 (H) 65 - 99 mg/dL    Bun 9 8 - 22 mg/dL    Creatinine 0.52 0.50 - 1.40 mg/dL    Calcium 8.7 8.5 - 10.5 mg/dL    AST(SGOT) 58 (H) 12 - 45 U/L    ALT(SGPT) 27 2 - 50 U/L    Alkaline Phosphatase 165 (H) 30 - 99 U/L    Total Bilirubin 4.8 (H) 0.1 - 1.5 mg/dL    Albumin 2.5 (L) 3.2 - 4.9 g/dL    Total Protein 5.7 (L) 6.0 - 8.2 g/dL    Globulin 3.2 1.9 - 3.5 g/dL    A-G Ratio 0.8 g/dL   TROPONIN    Collection Time: 09/25/22  4:59 PM   Result Value Ref Range    Troponin T 20 (H) 6 - 19 ng/L   ESTIMATED GFR    Collection Time: 09/25/22  4:59 PM   Result Value Ref Range    GFR (CKD-EPI) 99 >60 mL/min/1.73 m 2   ABO AND RH DETERMINATION    Collection Time: 09/25/22  4:59 PM   Result Value Ref Range    ABO Grouping Only A     Rh Grouping Only POS    POCT glucose device results    Collection Time: 09/25/22  5:05 PM   Result Value Ref Range    POC Glucose, Blood 125 (H) 65 - 99 mg/dL   EKG (NOW)    Collection Time: 09/25/22  5:34 PM   Result Value Ref Range    Report       [facility]    Test Date:  2022-09-25  Pt Name:    ADÁN LOFTON         Department: ER  MRN:        [id number]                      Room:       [facility]  Gender:     Male                         Technician: [id number]  :        [id number]                   Requested By:YAMILA LAURENT  Order #:    [id number]                    Reading MD:    Measurements  Intervals                                Axis  Rate:       64                           P:          0  AZ:         66                           QRS:        47  QRSD:       168                          T:          37  QT:         497  QTc:        513    Interpretive Statements  Sinus rhythm  Ventricular premature complex  Short AZ interval  Probable left atrial enlargement  Right bundle branch block  No previous ECG available for comparison     CoV-2, FLU A/B, and RSV by PCR (2-4 Hours CEPHEID) : Collect NP swab in VTM    Collection Time: 22  5:44 PM    Specimen: Respirate   Result Value Ref Range    Influenza virus A RNA Negative Negative    Influenza virus B, PCR Negative Negative    RSV, PCR Negative Negative    SARS-CoV-2 by PCR NotDetected     SARS-CoV-2 Source NP Swab    PLATELETS REQUEST    Collection Time: 22  8:54 PM   Result Value Ref Range    Component P       P07                 Plts,Pheresis       [id number]   transfused   22   22:27      Product Type Platelets  Pheresis LR     Dispense Status transfused     Unit Number (Barcoded) [id number]     Product Code (Barcoded) Y1615L71     Blood Type (Barcoded) 8400    PLATELET MAPPING WITH BASIC TEG    Collection Time: 22  9:34 PM   Result Value Ref Range    Reaction Time Initial-R 5.2 4.6 - 9.1 min    React Time Initial Hep 5.2 4.3 - 8.3 min    Clot Kinetics-K 2.8 (H) 0.8 - 2.1 min    Clot Angle-Angle 67.0 63.0 - 78.0 degrees    Maximum Clot Strength-MA <40.0 (L) 52.0 - 69.0 mm    TEG Functional Fibrinogen(MA) 7.9 (L) 15.0 - 32.0 mm    % Inhibition ADP see comment 0.0 - 17.0 %    % Inhibition AA see comment 0.0 - 11.0 %    TEG Algorithm Link Algorithm     Prothrombin Time    Collection Time: 09/25/22  9:34 PM   Result Value Ref Range    PT 26.7 (H) 12.0 - 14.6 sec    INR 2.56 (H) 0.87 - 1.13   URINALYSIS    Collection Time: 09/25/22 11:55 PM    Specimen: Urine   Result Value Ref Range    Color Yellow     Character Hazy (A)     Specific Gravity 1.015 <1.035    Ph 7.0 5.0 - 8.0    Glucose Negative Negative mg/dL    Ketones Negative Negative mg/dL    Protein Negative Negative mg/dL    Bilirubin Small (A) Negative    Urobilinogen, Urine >=8.0 Negative    Nitrite Negative Negative    Leukocyte Esterase Moderate (A) Negative    Occult Blood Negative Negative    Micro Urine Req Microscopic    URINE MICROSCOPIC (W/UA)    Collection Time: 09/25/22 11:55 PM   Result Value Ref Range    WBC 5-10 (A) /hpf    RBC 0-2 (A) /hpf    Bacteria Many (A) None /hpf    Epithelial Cells Negative /hpf    Ca Oxalate Crystal Few /hpf    Hyaline Cast 0-2 /lpf   FRESH FROZEN PLASMA    Collection Time: 09/26/22 12:06 AM   Result Value Ref Range    Component F       TA1                 Thawed Plasma 1     [id number]   issued       09/26/22   00:08      Product Type Thawed Apheresis Plasma 1st Cont     Dispense Status issued     Unit Number (Barcoded) [id number]     Product Code (Barcoded) Y1190M79     Blood Type (Barcoded) 8400     Component F       TA2                 Thawed Plasma 2     [id number]   issued       09/26/22   00:08      Product Type Thawed Apheresis Plasma 2nd Cont     Dispense Status issued     Unit Number (Barcoded) [id number]     Product Code (Barcoded) F5890Z02     Blood Type (Barcoded) 6200    Platelet Mapping (TEG)    Collection Time: 09/26/22  1:28 AM   Result Value Ref Range    Reaction Time Initial-R 3.5 (L) 4.6 - 9.1 min    React Time Initial Hep 3.7 (L) 4.3 - 8.3 min    Clot Kinetics-K 1.8 0.8 - 2.1 min    Clot Angle-Angle 71.0 63.0 - 78.0 degrees    Maximum Clot Strength-MA 43.7 (L) 52.0 - 69.0 mm    TEG Functional Fibrinogen(MA) 14.7 (L) 15.0 - 32.0 mm     Lysis 30 minutes-LY30 0.0 0.0 - 2.6 %    % Inhibition ADP 63.1 (H) 0.0 - 17.0 %    % Inhibition AA 35.6 (H) 0.0 - 11.0 %    TEG Algorithm Link Algorithm    CBC with Differential: Tomorrow AM    Collection Time: 09/26/22  5:03 AM   Result Value Ref Range    WBC 4.3 (L) 4.8 - 10.8 K/uL    RBC 2.89 (L) 4.70 - 6.10 M/uL    Hemoglobin 10.2 (L) 14.0 - 18.0 g/dL    Hematocrit 29.5 (L) 42.0 - 52.0 %    .1 (H) 81.4 - 97.8 fL    MCH 35.3 (H) 27.0 - 33.0 pg    MCHC 34.6 33.7 - 35.3 g/dL    RDW 63.3 (H) 35.9 - 50.0 fL    Platelet Count 84 (L) 164 - 446 K/uL    MPV 11.5 9.0 - 12.9 fL    Neutrophils-Polys 53.00 44.00 - 72.00 %    Lymphocytes 30.10 22.00 - 41.00 %    Monocytes 12.70 0.00 - 13.40 %    Eosinophils 3.00 0.00 - 6.90 %    Basophils 0.70 0.00 - 1.80 %    Immature Granulocytes 0.50 0.00 - 0.90 %    Nucleated RBC 0.00 /100 WBC    Neutrophils (Absolute) 2.29 1.82 - 7.42 K/uL    Lymphs (Absolute) 1.30 1.00 - 4.80 K/uL    Monos (Absolute) 0.55 0.00 - 0.85 K/uL    Eos (Absolute) 0.13 0.00 - 0.51 K/uL    Baso (Absolute) 0.03 0.00 - 0.12 K/uL    Immature Granulocytes (abs) 0.02 0.00 - 0.11 K/uL    NRBC (Absolute) 0.00 K/uL   Comp Metabolic Panel (CMP): Tomorrow AM    Collection Time: 09/26/22  5:03 AM   Result Value Ref Range    Sodium 141 135 - 145 mmol/L    Potassium 3.6 3.6 - 5.5 mmol/L    Chloride 111 96 - 112 mmol/L    Co2 24 20 - 33 mmol/L    Anion Gap 6.0 (L) 7.0 - 16.0    Glucose 100 (H) 65 - 99 mg/dL    Bun 9 8 - 22 mg/dL    Creatinine 0.37 (L) 0.50 - 1.40 mg/dL    Calcium 8.5 8.5 - 10.5 mg/dL    AST(SGOT) 42 12 - 45 U/L    ALT(SGPT) 22 2 - 50 U/L    Alkaline Phosphatase 152 (H) 30 - 99 U/L    Total Bilirubin 2.9 (H) 0.1 - 1.5 mg/dL    Albumin 2.2 (L) 3.2 - 4.9 g/dL    Total Protein 4.9 (L) 6.0 - 8.2 g/dL    Globulin 2.7 1.9 - 3.5 g/dL    A-G Ratio 0.8 g/dL   Magnesium: Every Monday and Thursday AM    Collection Time: 09/26/22  5:03 AM   Result Value Ref Range    Magnesium 1.5 1.5 - 2.5 mg/dL   Phosphorus: Every  Monday and Thursday AM    Collection Time: 09/26/22  5:03 AM   Result Value Ref Range    Phosphorus 2.4 (L) 2.5 - 4.5 mg/dL   ESTIMATED GFR    Collection Time: 09/26/22  5:03 AM   Result Value Ref Range    GFR (CKD-EPI) 109 >60 mL/min/1.73 m 2       Fluids    Intake/Output Summary (Last 24 hours) at 9/26/2022 1546  Last data filed at 9/26/2022 1400  Gross per 24 hour   Intake 2371.65 ml   Output 300 ml   Net 2071.65 ml       Core Measures & Quality Metrics  Labs reviewed, Medications reviewed and Radiology images reviewed  [place]ca catheter: No [place]ca      DVT Prophylaxis: Contraindicated - High bleeding risk  DVT prophylaxis - mechanical: SCDs  Ulcer prophylaxis: Not indicated    Assessed for rehab: Patient was assess for and/or received rehabilitation services during this hospitalization  RAP Score Total: 9  CAGE Results: negative Blood Alcohol>0.08: not completed     Assessment/Plan  * Traumatic subdural hematoma, initial encounter (HCC)- (present on admission)  Assessment & Plan  Hyperdense hemispheric right subdural hematoma measuring 1.7 cm in thickness with localized mass effect and 2 mm leftward midline shift.  1.2 cm thick mixed density left frontoparietal convexity subdural hematoma.  BIG 3.  Repeat CT stable.  Possible draining SDH sometime this week.  Post traumatic pharmacologic seizure prophylaxis for 1 week.  Speech Language Pathology cognitive evaluation.  Popeye Reece MD, PhD. Neurosurgeon. [facility].    Hypophosphatemia- (present on admission)  Assessment & Plan  9/26 phosp 2.4  - Replete.  Monitor.    Thrombocytopenia (HCC)- (present on admission)  Assessment & Plan  Admit plts 76K  Will transfuse  TEG/INR pending    History of dementia- (present on admission)  Assessment & Plan  Per family his baseline is A&O x 2-3.    Contraindication to deep vein thrombosis (DVT) prophylaxis- (present on admission)  Assessment & Plan  Prophylactic anticoagulation for thrombotic prevention  initially contraindicated secondary to elevated bleeding risk.  9/26 Trauma surveillance venous duplex scanning ordered.    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 It was making him groggy.    Trauma- (present on admission)  Assessment & Plan  Frequent falls.  Non Trauma Activation.  Jenny Umana MD. Trauma Surgery.    Depressive disorder- (present on admission)  Assessment & Plan  History of per chart review. Family states patient does not take any medication.    GERD (gastroesophageal reflux disease)- (present on admission)  Assessment & Plan  History of per chart review. Family states patient does not take any medication.    Hypertension- (present on admission)  Assessment & Plan  History of per chart review. Family states he does not take any medication.    Mental status adequate for full examination?: No, baseline A/O x 2-3, history of dementia.    Spine cleared (radiologically and/or clinically): Yes    All current laboratory studies/radiology exams reviewed: Yes    Medications reconciliation has been reviewed: Yes    Completed Consultations:  Neurosurgery.     Pending Consultations:  None.    Newly Identified Diagnoses and Injuries:  Unable to identify any new injuries due to mental status.        Discussed patient condition with RN, Patient, and trauma surgery, Dr. Plolard.

## 2022-09-26 NOTE — ASSESSMENT & PLAN NOTE
Advanced dementia and normally oriented to self only  Has been on Seroquel in the past for behavioral outbursts, but was oversedated on that medication and that has since been stopped

## 2022-09-26 NOTE — ASSESSMENT & PLAN NOTE
1.7 cm  BIG 3  Neurosurgery following and after discussion with family decision was for non surgical management  Decadron per neurosurgery recommendations

## 2022-09-26 NOTE — ED PROVIDER NOTES
ED Provider Note    CHIEF COMPLAINT  Chief Complaint   Patient presents with    ALOC     Last known well 1hr prior to arrival. Pt was at home with family when he became more lethargic and had to sit down.  EMS called. On arrival BP 80/50.  Pt given 400ml fluid by EMS which improved BP to 120s.  Glood sugar for .  Pt has history of dementia so baseline is to be A&Ox2-3.        HPI  Jesse Conway is a 87 y.o. male who presents with a chief complaint of altered level of consciousness. Duration been about one hour. It was described as him being lethargic. Happened after he was had an outburst of emotion. According to the family the daughter was there to give a history of patient has dementia the patient gets angry but then gets sleepy but this time he leaned forward and was difficult to arouse.    In addition, he had a bowel movement. Typically they'll change him in his diapers. However when he lift up his legs he was difficult to arouse. Please note that his blood pressure was low by EMS he got fluids. Unknown if that's the case usually has dementia    Daughter stated that she is his caregiver and and stated that he did not need . The patient has not had this before. In addition he fell earlier this week so there were about head injury other not see any bruising or swelling of the head. No associated fever chills. He is required but now is more sleepy than normal.    REVIEW OF SYSTEMS  see above.    PAST MEDICAL HISTORY  Past Medical History:   Diagnosis Date    GERD (gastroesophageal reflux disease) 08/03/2012    H. pylori infection 08/03/2012    Depression 08/03/2012    Impaired fasting glucose 08/03/2012    DJD (degenerative joint disease) 04/22/2010    Heart attack (HCC) 01/01/2001    Arthritis     CATARACT     Dementia (HCC)     Hypertension     SunDown syndrome        FAMILY HISTORY  Family History   Problem Relation Age of Onset    Diabetes Mother     Hyperlipidemia Mother      "Hyperlipidemia Other         hyperlipidemia    Stroke Neg Hx     Heart Disease Neg Hx        SOCIAL HISTORY  Social History     Socioeconomic History    Marital status:    Tobacco Use    Smoking status: Former     Packs/day: 0.50     Years: 50.00     Pack years: 25.00     Types: Cigarettes     Quit date: 2004     Years since quittin.7    Smokeless tobacco: Never   Vaping Use    Vaping Use: Never used   Substance and Sexual Activity    Alcohol use: Not Currently     Comment: occasionally.     Drug use: No    Sexual activity: Yes     Partners: Female     Comment:        SURGICAL HISTORY  Past Surgical History:   Procedure Laterality Date    CATARACT EXTRACTION WITH IOL         CURRENT MEDICATIONS  No current facility-administered medications on file prior to encounter.     Current Outpatient Medications on File Prior to Encounter   Medication Sig Dispense Refill    tobramycin (TOBREX) 0.3 % Solution ophthalmic solution Place 1 Drop in both eyes every 4 hours. 1 Bottle 0    CINNAMON PO Take  by mouth.      albuterol 108 (90 Base) MCG/ACT Aero Soln inhalation aerosol Inhale 2 Puffs by mouth every 6 hours as needed for Shortness of Breath. (Patient not taking: Reported on 2019) 8.5 g 0    benzonatate (TESSALON) 100 MG Cap Take 1 Cap by mouth 3 times a day as needed for Cough. (Patient not taking: Reported on 2019) 60 Cap 0       ALLERGIES  Allergies   Allergen Reactions    Pcn [Penicillins] Rash and Nausea     .       PHYSICAL EXAM  VITAL SIGNS: /46   Pulse 87   Temp 35.9 °C (96.7 °F) (Temporal)   Resp 13   Ht 1.626 m (5' 4\")   Wt 68 kg (150 lb)   SpO2 93%   BMI 25.75 kg/m²       Constitutional: Well developed, Well nourished, No acute distress, Non-toxic appearance.   HENT: atraumatic negative raccoon eyes  negative vital sign mouth shows no tongue lacerations   Eyes: pupils are equal round reactive.  Neck: Normal range of motion, No tenderness, Supple, No stridor. "   Cardiovascular: Normal heart rate, Normal rhythm, No murmurs, No rubs, No gallops.   Thorax & Lungs: Normal breath sounds, No respiratory distress, No wheezing, No chest tenderness.   Abdomen: Bowel sounds normal, Soft, No tenderness, No masses, No pulsatile masses.   Skin: Warm, Dry, No erythema, No rash.   Back: No tenderness, No CVA tenderness.   Extremities: Intact distal pulses, No edema, No tenderness, No cyanosis, No clubbing.   Neurologic: moves all extremities.  Psychiatric: Affect normal, Judgment normal, Mood normal.         RADIOLOGY/PROCEDURES  CT-HEAD W/O   Final Result      1. Hyperdense hemispheric right subdural hematoma measuring 1.7 cm in thickness with localized mass effect and 2 mm leftward midline shift.   2. 1.2 cm thick mixed density left frontoparietal convexity subdural hematoma.   3. Chronic microvascular ischemic type changes.      Based solely on CT findings, the brain injury guideline category is mBIG 3.      EDH   IVH   Displaced skull fx   SDH > 8mm   IPH > 8mm or multiple   SAH bi-hemispheric or > 3mm      The original BIG retrospective analysis found radiographic progression in 0% of BIG 1 patients and 2.6% BIG 2.      These findings were discussed with YAMILA LAURENT on 9/25/2022 6:57 PM.      DX-CHEST-PORTABLE (1 VIEW)   Final Result      Mild interstitial prominence which is nonspecific but possibly represents vascular congestion.        Results for orders placed or performed during the hospital encounter of 09/25/22   CBC WITH DIFFERENTIAL   Result Value Ref Range    WBC 4.8 4.8 - 10.8 K/uL    RBC 3.75 (L) 4.70 - 6.10 M/uL    Hemoglobin 13.0 (L) 14.0 - 18.0 g/dL    Hematocrit 38.4 (L) 42.0 - 52.0 %    .4 (H) 81.4 - 97.8 fL    MCH 34.7 (H) 27.0 - 33.0 pg    MCHC 33.9 33.7 - 35.3 g/dL    RDW 63.3 (H) 35.9 - 50.0 fL    Platelet Count 76 (L) 164 - 446 K/uL    MPV 12.3 9.0 - 12.9 fL    Neutrophils-Polys 49.40 44.00 - 72.00 %    Lymphocytes 35.70 22.00 - 41.00 %     Monocytes 10.50 0.00 - 13.40 %    Eosinophils 2.70 0.00 - 6.90 %    Basophils 1.30 0.00 - 1.80 %    Immature Granulocytes 0.40 0.00 - 0.90 %    Nucleated RBC 0.00 /100 WBC    Neutrophils (Absolute) 2.35 1.82 - 7.42 K/uL    Lymphs (Absolute) 1.70 1.00 - 4.80 K/uL    Monos (Absolute) 0.50 0.00 - 0.85 K/uL    Eos (Absolute) 0.13 0.00 - 0.51 K/uL    Baso (Absolute) 0.06 0.00 - 0.12 K/uL    Immature Granulocytes (abs) 0.02 0.00 - 0.11 K/uL    NRBC (Absolute) 0.00 K/uL   COMP METABOLIC PANEL   Result Value Ref Range    Sodium 139 135 - 145 mmol/L    Potassium 3.6 3.6 - 5.5 mmol/L    Chloride 107 96 - 112 mmol/L    Co2 22 20 - 33 mmol/L    Anion Gap 10.0 7.0 - 16.0    Glucose 154 (H) 65 - 99 mg/dL    Bun 9 8 - 22 mg/dL    Creatinine 0.52 0.50 - 1.40 mg/dL    Calcium 8.7 8.5 - 10.5 mg/dL    AST(SGOT) 58 (H) 12 - 45 U/L    ALT(SGPT) 27 2 - 50 U/L    Alkaline Phosphatase 165 (H) 30 - 99 U/L    Total Bilirubin 4.8 (H) 0.1 - 1.5 mg/dL    Albumin 2.5 (L) 3.2 - 4.9 g/dL    Total Protein 5.7 (L) 6.0 - 8.2 g/dL    Globulin 3.2 1.9 - 3.5 g/dL    A-G Ratio 0.8 g/dL   TROPONIN   Result Value Ref Range    Troponin T 20 (H) 6 - 19 ng/L   CoV-2, FLU A/B, and RSV by PCR (2-4 Hours CEPHEID) : Collect NP swab in VTM    Specimen: Respirate   Result Value Ref Range    Influenza virus A RNA Negative Negative    Influenza virus B, PCR Negative Negative    RSV, PCR Negative Negative    SARS-CoV-2 by PCR NotDetected     SARS-CoV-2 Source NP Swab    ESTIMATED GFR   Result Value Ref Range    GFR (CKD-EPI) 99 >60 mL/min/1.73 m 2   EKG (NOW)   Result Value Ref Range    Report       [facility] Emergency Dept[facility]    Test Date:  2022  Pt Name:    ADÁN LOFTON        Department: ER  MRN:        [id number]                      Room:       [facility]  Gender:     Male                         Technician: [id number]  :        [id number]                   Requested By:YAMILA LAURENT  Order #:    [id number]                     Reading MD:    Measurements  Intervals                                Axis  Rate:       64                           P:          0  KY:         66                           QRS:        47  QRSD:       168                          T:          37  QT:         497  QTc:        513    Interpretive Statements  Sinus rhythm  Ventricular premature complex  Short KY interval  Probable left atrial enlargement  Right bundle branch block  No previous ECG available for comparison     POCT glucose device results   Result Value Ref Range    POC Glucose, Blood 125 (H) 65 - 99 mg/dL        COURSE & MEDICAL DECISION MAKING  Pertinent Labs & Imaging studies reviewed. (See chart for details)  here with episode of possible syncope versus seizure versus unknown. With hypotension. No obvious signs of infection did not make sepsis criteria. Differential is wide at this point of hematologic versus endocrine versus metabolic versus intracranial issues among others.    Plan  CT head  CBC metabolic panel  urinalysis  COVID  chest x-ray  EKG  troponin    7:33 PM  I was notified by RADIOLOGY regarding trauma  spoke to Dr. Clark she is aware  spoke to Dr. Reece. Recommended ICU.  I also spoke to the family show than the CT scans. At this point the patient is in guarded condition. Be amended to the hospital.    FINAL IMPRESSION  1. Subdural hematoma  2.   3.      Electronically signed by: Arik Benítez M.D., 9/25/2022 5:31 PM

## 2022-09-26 NOTE — ED NOTES
Med rec updated and complete. Allergies reviewed. Interviewed family at beside. Pt takes no medications.     Confirmed name and date of birth        Home pharmacy  [facility] [phone]

## 2022-09-26 NOTE — ASSESSMENT & PLAN NOTE
Hyperdense hemispheric right subdural hematoma measuring 1.7 cm in thickness with localized mass effect and 2 mm leftward midline shift.  1.2 cm thick mixed density left frontoparietal convexity subdural hematoma.  BIG 3.  Repeat CT stable.  Possible draining SDH sometime this week.  Post traumatic pharmacologic seizure prophylaxis for 1 week.  Speech Language Pathology cognitive evaluation.  Popeye Reece MD, PhD. Neurosurgeon. [facility].

## 2022-09-26 NOTE — H&P
TRAUMA ADMISSION HISTORY AND PHYSICAL    DATE OF ADMISSION:  09/25/2022     IDENTIFICATION:  An 87-year-old male.     HISTORY OF PRESENT ILLNESS:  The patient came in with an altered level of   consciousness.  He was not activated as a trauma.  He apparently became more   lethargic over the last few days.  He did have a remote fall approximately 4   days ago.  He was brought to the emergency room where a head CT was performed,   which demonstrates him to have a subdural hematoma consistent with BIG 3   criteria.  I have been asked to see him in regards to this.     PAST MEDICAL HISTORY:  ILLNESSES:  Dementia, hypertension, history of coronary artery disease.     PAST SURGICAL HISTORY:  Cataract surgery.     MEDICATIONS:  Tobrex, ____, albuterol and Tessalon.     ALLERGIES:  PENICILLIN.     SOCIAL HISTORY:  Lives with his daughter.     PHYSICAL EXAMINATION:  VITAL SIGNS:  Blood pressure is 111/46, heart rates in the 80s.  GENERAL:  He is alert, but at baseline.  HEENT:  Pupils are 4 mm.  He has no evidence of actual scalp injury.  NECK:  Supple.  CHEST:  Nontender.  ABDOMEN:  Soft.  PELVIS:  Stable.  EXTREMITIES:  He is moving all extremities.  NEUROLOGIC:  He has a GCS of 14.     LABORATORY DATA:  Blood work demonstrates him to have a hemoglobin of 13 and   platelet count of 76,000.  Electrolytes are normal.  His liver function tests   were elevated with a bilirubin of 4.8 and an alk phos of 165.     DIAGNOSTIC DATA:  Head CT demonstrates a subdural hematoma measuring 1.7 cm in   the right and 1.2 cm on the left.     IMPRESSION:  An 87-year-old male status post a remote fall with bilateral   subdural hematomas consistent with BIG 3 criteria.     PLAN:  Will be admission to the ICU.  A neurosurgical consultation with Dr. Reece will be obtained.  We will give him platelets as well as check   coagulation factors, which have not been sent yet and do serial neurologic   exams as well as a followup head CT in 6  hours.        ______________________________  JARRETT VALDEZ MD    [facility]    DD:  09/25/2022 20:51  DT:  09/25/2022 21:31    Job#:  [id number]

## 2022-09-26 NOTE — CONSULTS
Neurosurgery consultation  2022  Called 1930 pt evaluated   Referring md Dr. Lopez  Reason for referral:  right subdural hematoma     CHIEF COMPLAINT  weakness     HPI  Jesse Conway is a 87 y.o. male with history of severe dementia who who  had an outburst of emotion then he leaned forward and was difficult to arouse.     In addition, he had a bowel movement. Typically they'll change him in his diapers. However when he lift up his legs he was difficult to arouse. Please note that his blood pressure was low by EMS he got fluids. Unknown if that's the case usually has dementia    Daughter stated that she is his caregiver and and stated that he did not need . The patient has not had this before. In addition he fell earlier this week so there were about head injury other not see any bruising or swelling of the head. No associated fever chills. He is required but now is more sleepy than normal.     REVIEW OF SYSTEMS  see above.     PAST MEDICAL HISTORY       Past Medical History:   Diagnosis Date    GERD (gastroesophageal reflux disease) 2012    H. pylori infection 2012    Depression 2012    Impaired fasting glucose 2012    DJD (degenerative joint disease) 2010    Heart attack (HCC) 2001    Arthritis      CATARACT      Dementia (HCC)      Hypertension      SunDown syndrome           FAMILY HISTORY        Family History   Problem Relation Age of Onset    Diabetes Mother      Hyperlipidemia Mother      Hyperlipidemia Other           hyperlipidemia    Stroke Neg Hx      Heart Disease Neg Hx           SOCIAL HISTORY  Social History               Socioeconomic History    Marital status:    Tobacco Use    Smoking status: Former       Packs/day: 0.50       Years: 50.00       Pack years: 25.00       Types: Cigarettes       Quit date: 2004       Years since quittin.7    Smokeless tobacco: Never   Vaping Use    Vaping Use: Never used   Substance  "and Sexual Activity    Alcohol use: Not Currently       Comment: occasionally.     Drug use: No    Sexual activity: Yes       Partners: Female       Comment:             SURGICAL HISTORY        Past Surgical History:   Procedure Laterality Date    CATARACT EXTRACTION WITH IOL             CURRENT MEDICATIONS  No current facility-administered medications on file prior to encounter.             Current Outpatient Medications on File Prior to Encounter   Medication Sig Dispense Refill    tobramycin (TOBREX) 0.3 % Solution ophthalmic solution Place 1 Drop in both eyes every 4 hours. 1 Bottle 0    CINNAMON PO Take  by mouth.        albuterol 108 (90 Base) MCG/ACT Aero Soln inhalation aerosol Inhale 2 Puffs by mouth every 6 hours as needed for Shortness of Breath. (Patient not taking: Reported on 2/12/2019) 8.5 g 0    benzonatate (TESSALON) 100 MG Cap Take 1 Cap by mouth 3 times a day as needed for Cough. (Patient not taking: Reported on 2/12/2019) 60 Cap 0         ALLERGIES        Allergies   Allergen Reactions    Pcn [Penicillins] Rash and Nausea       .         PHYSICAL EXAM  VITAL SIGNS: /46   Pulse 87   Temp 35.9 °C (96.7 °F) (Temporal)   Resp 13   Ht 1.626 m (5' 4\")   Wt 68 kg (150 lb)   SpO2 93%   BMI 25.75 kg/m²        Lying in bed comfortably  Not following commands even with   Awake, tracking, moving extremities symmetric, appropriate with no obvious deficit  Responds to touch x 4 extremiteis     RADIOLOGY/PROCEDURES  CT-HEAD W/O   Final Result       1. Hyperdense hemispheric right subdural hematoma measuring 1.7 cm in thickness with localized mass effect and 2 mm leftward midline shift.   2. 1.2 cm thick mixed density left frontoparietal convexity subdural hematoma.   3. Chronic microvascular ischemic type changes.         AP:  87 year old male with right subacute SDH with minimal midline shift due to severe generalized cerebral atrophy.  Will discuss draining the hematoma with his " family when they are available.   was utilized during entire visit

## 2022-09-26 NOTE — ED NOTES
Blood sugar checked with result of 125.  Daughter at bedside. Per Daughter, pt has had frequent falls. She believed he may have hit his head on Tuesday or Wednesday. -LOC, -blood thinners.

## 2022-09-26 NOTE — PROGRESS NOTES
Patient transferred to receiving Neuroscience RN [id number], report called, VSS, family with patient during transport.

## 2022-09-27 PROBLEM — R79.89 ELEVATED LFTS: Status: ACTIVE | Noted: 2022-01-01

## 2022-09-27 NOTE — PROGRESS NOTES
Hospital Medicine Daily Progress Note    Date of Service  9/27/2022    Chief Complaint  Jesse Conway is a 87 y.o. male admitted 9/25/2022 with altered mental status    Hospital Course  87 y.o. male who presented 9/25/2022 with a previous medical history that includes dementia, GERD, and hypertension.     Patient was admitted by the trauma service on September 25.  He had been having worsening mental status and difficulty walking for approximately 4 days prior to his presentation.  CT done in the ED demonstrated a 1.7cm subdural hematoma in the right hemisphere consistent with BIG 3.  He was admitted to the ICU with consultation from neurosurgery.    Interval Problem Update    Family feels that patient is close to his baseline  Tolerating diet  Neurosurgery met with patient and family decision is to proceed with nonsurgical management and a Decadron taper  PT OT ordered    I have discussed this patient's plan of care and discharge plan at IDT rounds today with Case Management, Nursing, Nursing leadership, and other members of the IDT team.    Consultants/Specialty  neurosurgery    Code Status  Full Code    Disposition  Patient is not medically cleared for discharge.   Anticipate discharge to to home with close outpatient follow-up.  I have placed the appropriate orders for post-discharge needs.    Review of Systems  Review of Systems   Unable to perform ROS: Dementia      Physical Exam  Temp:  [36.2 °C (97.2 °F)-37.1 °C (98.8 °F)] 36.2 °C (97.2 °F)  Pulse:  [65-76] 66  Resp:  [18-20] 19  BP: (124-147)/(53-90) 147/53  SpO2:  [91 %-98 %] 96 %    Physical Exam  Vitals and nursing note reviewed.   Constitutional:       Appearance: He is well-developed. He is not diaphoretic.   HENT:      Head: Normocephalic and atraumatic.      Mouth/Throat:      Pharynx: No oropharyngeal exudate.   Eyes:      General: No scleral icterus.        Right eye: No discharge.         Left eye: No discharge.      Conjunctiva/sclera:  Conjunctivae normal.      Pupils: Pupils are equal, round, and reactive to light.   Neck:      Vascular: No JVD.      Trachea: No tracheal deviation.   Cardiovascular:      Rate and Rhythm: Normal rate and regular rhythm.      Heart sounds: No murmur heard.    No friction rub. No gallop.   Pulmonary:      Effort: Pulmonary effort is normal. No respiratory distress.      Breath sounds: Normal breath sounds. No stridor. No wheezing.   Chest:      Chest wall: No tenderness.   Abdominal:      General: Bowel sounds are normal. There is no distension.      Palpations: Abdomen is soft.      Tenderness: There is no abdominal tenderness. There is no rebound.   Musculoskeletal:         General: No tenderness.      Cervical back: Neck supple.   Skin:     General: Skin is warm and dry.      Nails: There is no clubbing.   Neurological:      Mental Status: He is alert.      Cranial Nerves: No cranial nerve deficit.      Motor: No abnormal muscle tone.      Comments: Oriented x2   Psychiatric:         Behavior: Behavior normal.       Fluids    Intake/Output Summary (Last 24 hours) at 9/27/2022 1642  Last data filed at 9/27/2022 0514  Gross per 24 hour   Intake --   Output 575 ml   Net -575 ml       Laboratory  Recent Labs     09/25/22  1659 09/26/22  0503 09/27/22  0430   WBC 4.8 4.3* 3.4*   RBC 3.75* 2.89* 3.02*   HEMOGLOBIN 13.0* 10.2* 10.7*   HEMATOCRIT 38.4* 29.5* 30.9*   .4* 102.1* 102.3*   MCH 34.7* 35.3* 35.4*   MCHC 33.9 34.6 34.6   RDW 63.3* 63.3* 63.3*   PLATELETCT 76* 84* 77*   MPV 12.3 11.5 11.2     Recent Labs     09/25/22  1659 09/26/22  0503 09/27/22  0430   SODIUM 139 141 138   POTASSIUM 3.6 3.6 4.1   CHLORIDE 107 111 109   CO2 22 24 24   GLUCOSE 154* 100* 94   BUN 9 9 7*   CREATININE 0.52 0.37* 0.25*   CALCIUM 8.7 8.5 8.1*     Recent Labs     09/25/22  2134   INR 2.56*               Imaging  CT-HEAD W/O   Final Result         1.  Right holohemispheric acute subdural hematoma, similar compared to prior study  given difference of technique   2.  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   3.  Atherosclerosis.         CT-HEAD W/O   Final Result      1. Hyperdense hemispheric right subdural hematoma measuring 1.7 cm in thickness with localized mass effect and 2 mm leftward midline shift.   2. 1.2 cm thick mixed density left frontoparietal convexity subdural hematoma.   3. Chronic microvascular ischemic type changes.      Based solely on CT findings, the brain injury guideline category is mBIG 3.      EDH   IVH   Displaced skull fx   SDH > 8mm   IPH > 8mm or multiple   SAH bi-hemispheric or > 3mm      The original BIG retrospective analysis found radiographic progression in 0% of BIG 1 patients and 2.6% BIG 2.      These findings were discussed with YAMILA LAURENT on 9/25/2022 6:57 PM.      DX-CHEST-PORTABLE (1 VIEW)   Final Result      Mild interstitial prominence which is nonspecific but possibly represents vascular congestion.           Assessment/Plan  * Traumatic subdural hematoma, initial encounter (HCC)- (present on admission)  Assessment & Plan  1.7 cm  BIG 3  Neurosurgery following and after discussion with family decision was for non surgical management  Decadron per neurosurgery recommendations    Elevated LFTs  Assessment & Plan  Check US    Thrombocytopenia (HCC)- (present on admission)  Assessment & Plan  Chronic going back at least to 2019 in our records  Monitor CBC    History of dementia- (present on admission)  Assessment & Plan  Advanced dementia and normally oriented to self only  Has been on Seroquel in the past for behavioral outbursts, but was oversedated on that medication and that has since been stopped    GERD (gastroesophageal reflux disease)- (present on admission)  Assessment & Plan  PPI    Hypertension- (present on admission)  Assessment & Plan  Continue amlodipine and monitor        VTE  prophylaxis: SCDs/TEDs    I have performed a physical exam and reviewed and updated ROS and Plan today (9/27/2022). In review of yesterday's note (9/26/2022), there are no changes except as documented above.

## 2022-09-27 NOTE — THERAPY
"Speech Language Pathology   Initial Assessment     Patient Name: Jesse Conway  AGE:  87 y.o., SEX:  male  Medical Record #: [id number]  Today's Date: 2022     Precautions  Precautions: Fall Risk, Swallow Precautions ( See Comments)    HPI: Pt is an 87 y.o. male who presented 2022. He had been having worsening mental status and difficulty walking for approximately four days prior to his presentation.     CMHx: Traumatic SDH  PMHx: GERD, dementia, sundowning, HTN, CAD    CXR :  \"Mild interstitial prominence which is nonspecific but possibly represents vascular congestion.\"    CT Head w/o :  \"1.  Right holohemispheric acute subdural hematoma, similar compared to prior study given difference of technique  2.  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  3.  Atherosclerosis.\"    Level of Consciousness: Alert, Awake  Affect/Behavior: Calm, Cooperative, Distracted  Follows Directives: Yes - simple commands only  Orientation: Self, , oriented to place using Y/N, disoriented to time given Y/N  Hearing: Functional hearing  Vision: Functional vision      Prior Living Situation & Level of Function:  Pt reports eating softer foods at baseline. Confirmed w/ daughter Kellen, who said pt was on likely SB6/TN0 diet, stating \"Like if we were going to give him chips, it would be Cheetos Puffs.\"      Oral Mechanism Evaluation  Facial Symmetry: Equal  Facial Sensation: Pt did not follow commands to assess  Labial Observations: Open mouth posture  Lingual Observations: Midline  Dentition: Edentulous  Comments: Limited oral mechanism evaluation d/t pt following simple commands only. Benefited from visual cues.       Voice  Quality:  Hoarse, slightly congested prior to PO  Resonance: WFL  Intensity: Alternating  Cough: WFL  Comments:      Current Method of Nutrition   NPO until cleared by speech " pathology      Subjective  Pt awake, cooperative w/ SLP evaluation tasks given consistent multisensory cues to attend to task. Pt w/ verbalized desire for PO, reported no difficulty eating at baseline. Language Line used to translate from Telugu.        Assessment  Positioning: Bed - Chair Position  Bolus Administration: SLP and Patient  Oxygen Requirements: Room Air  Factor(s) Affecting Performance: Impaired mental status    Swallowing Trials  Ice: Impaired  Thin Liquid (TN0): Impaired  Liquidised (LQ3): Impaired    Comments:  Pt w/ open mouth posture at rest, incomplete labial closure for bolus containment. Anterior spillage noted x1 w/ thin liquids x1 and with liquidized solids. Inconsistent oral bolus stripping from spoon. Given ice chip trials, suspect piecemeal deglutition d/t appreciable swallow w/ part of bolus still observed in oral cavity. Intermittent mild residue noted w/ LQ3, cleared w/ independent second swallow. Pt w/ throat clear x1 w/ ice chips, cough x1 given sequential sips thin. No change in 02 sats or respiratory work. No change in vocal quality throughout evaluation. No s/sx concerning for pharyngeal inefficiency. 4 oz LQ3, 3oz TN0. Pt w/ fair self-feeding, will need mod A.        Clinical Impressions  Pt presents w/ s/sx of oropharyngeal dysphagia, evidenced by impaired oral phase and cough w/ sequential sips thin. Discussed MBS vs. trial diet with pt's daughter, Kellen, who verbalized preference for trial diet and high FU w/ SLP. Recommend trial of MM5/TN0 w/ 1:1 feeding and precautions as listed below and posted HOB.        Recommendations  1.  Trial diet of MM5/TN0 given 1:1 supervision  2.  Instrumentation: Instrumental swallow study pending clinical progress - recommend MBS vs. GOC if s/sx of aspiration occur w/ full meal  3.  Swallowing Instructions & Precautions:   Supervision: 1:1 feeding with constant supervision, Encourage self-feeding  Positioning: Fully upright and midline during  "oral intake, Remain upright for 30 minutes after oral intake  Medication: Crush with applesauce or puree, as appropriate  Strategies: Small bites/sips, Slow rate of intake, Multiple swallows (x 2) per bite/sip   Oral Care: Q4h  4.  SLP will follow to ensure tolerance and reinforce precautions.       Plan    Recommend Speech Therapy 4 times per week until therapy goals are met for the following treatments:  Dysphagia Training and Patient / Family / Caregiver Education.         Objective   09/27/22 0853   Initial Contact Note    Initial Contact Note  Order Received and Verified, Speech Therapy Evaluation in Progress with Full Report to Follow.   Vitals   O2 Delivery Device None - Room Air   Prior Living Situation   Prior Services Unable To Determine At This Time   Lives with - Patient's Self Care Capacity Adult Children   Prior Level Of Function   Communication Impaired   Swallow Impaired   Dentition Edentulous   Patient's Primary Language Cook Islander   Patient / Family Goals   Patient / Family Goal #1 \"He can eat whatever as long as it's soft\"   Short Term Goals   Short Term Goal # 1 Pt will consume diet of MM5/TN0 given 1:1 and precautions as posted w/o overt s/sx of aspiration or worsening of lung status.   Education Group   Education Provided Dysphagia;Role of Speech Therapy   Dysphagia Patient Response Patient;Family;Acceptance;Explanation;Verbal Demonstration;Action Demonstration;Reinforcement Needed   Role of SLP Patient Response Patient;Family;Acceptance;Explanation;Verbal Demonstration;Reinforcement Needed   Additional Comments Pt will need reinforcement, limited learning evidence   Problem List   Problem List Dysphagia;Dementia   Anticipated Discharge Needs   Therapy Recommendations Upon DC Dysphagia Training;Patient / Family / Caregiver Education   Interdisciplinary Plan of Care Collaboration   IDT Collaboration with  Nursing;Family / Caregiver   Patient Position at End of Therapy Seated;In Bed;Bed Alarm " On;Call Light within Reach  (All verbalized needs met.)   Collaboration Comments RN updated, daughter updated

## 2022-09-27 NOTE — PROGRESS NOTES
4 Eyes Skin Assessment Completed by GIAN Raphael and GIAN García.    Head WDL  Ears WDL  Nose WDL  Mouth WDL  Neck WDL  Breast/Chest WDL  Shoulder Blades WDL  Spine WDL  (R) Arm/Elbow/Hand Redness, Blanching, and Bruising to left hand  (L) Arm/Elbow/Hand Redness and Blanching  Abdomen WDL  Groin WDL  Scrotum/Coccyx/Buttocks Redness and Blanching  (R) Leg WDL  (L) Leg WDL  (R) Heel/Foot/Toe Swelling  (L) Heel/Foot/Toe Scab          Devices In Places Pulse Ox      Interventions In Place Sacral Mepilex    Possible Skin Injury No    Pictures Uploaded Into Epic N/A  Wound Consult Placed N/A  RN Wound Prevention Protocol Ordered No

## 2022-09-27 NOTE — CARE PLAN
The patient is Watcher - Medium risk of patient condition declining or worsening    Shift Goals  Clinical Goals: SLP, monitor neuro status  Patient Goals: Sleep;  Family Goals: Safety;    Progress made toward(s) clinical / shift goals:  SLP saw patient this morning. Started on level 5 minced and moist with thin liquids.     Patient is not progressing towards the following goals:

## 2022-09-27 NOTE — HOSPITAL COURSE
87 y.o. male who presented 9/25/2022 with a previous medical history that includes dementia, GERD, and hypertension.     Patient was admitted by the trauma service on September 25.  He had been having worsening mental status and difficulty walking for approximately 4 days prior to his presentation.  CT done in the ED demonstrated a 1.7cm subdural hematoma in the right hemisphere consistent with BIG 3.  He was admitted to the ICU with consultation from neurosurgery.

## 2022-09-27 NOTE — PROGRESS NOTES
Neurosurgery Progress Note    Subjective:  No acute events overnight.   Pt awake, restless. Indonesian speaking only.   Son at bedside, translating   Son states patient is near or at baseline     Exam:  Awake. Indonesian speaking only. AAO x 2, self and place, confused on year   -drift, 2 PERRL. EOMI   Follows commands   LAGOS  Sensation: Intact throughout.  [place]         BP  Min: 124/89  Max: 162/73  Pulse  Av.2  Min: 65  Max: 87  Resp  Av.1  Min: 18  Max: 40  Temp  Av.8 °C (98.2 °F)  Min: 36.3 °C (97.3 °F)  Max: 37.1 °C (98.8 °F)  Monitored Temp 2  Av.6 °C (97.9 °F)  Min: 36.6 °C (97.9 °F)  Max: 36.6 °C (97.9 °F)  SpO2  Av.1 %  Min: 91 %  Max: 98 %    No data recorded    Recent Labs     22  0503 09/27/22  0430   WBC 4.8 4.3* 3.4*   RBC 3.75* 2.89* 3.02*   HEMOGLOBIN 13.0* 10.2* 10.7*   HEMATOCRIT 38.4* 29.5* 30.9*   .4* 102.1* 102.3*   MCH 34.7* 35.3* 35.4*   MCHC 33.9 34.6 34.6   RDW 63.3* 63.3* 63.3*   PLATELETCT 76* 84* 77*   MPV 12.3 11.5 11.2       Recent Labs     22  0503 22  0430   SODIUM 139 141 138   POTASSIUM 3.6 3.6 4.1   CHLORIDE 107 111 109   CO2 22 24 24   GLUCOSE 154* 100* 94   BUN 9 9 7*   CREATININE 0.52 0.37* 0.25*   CALCIUM 8.7 8.5 8.1*       Recent Labs     22   INR 2.56*       Recent Labs     22  0128   REACTMIN 5.2 3.5*   CLOTKINET 2.8* 1.8   CLOTANGL 67.0 71.0   MAXCLOTS <40.0* 43.7*   [id number]  --  0.0   PRCINADP see comment 63.1*   PRCINAA see comment 35.6*         Intake/Output                         22 - 22 0659 22 - 22 0659      Total  Total                 Intake    P.O.  300  -- 300  --  -- --    P.O. 300 -- 300 -- -- --    I.V.  633.3  -- 633.3  --  -- --    Magnesium Sulfate Volume 33.3 -- 33.3 -- -- --    Volume (mL) (NS infusion) 600 -- 600 -- -- --    IV Piggyback  111.1  -- 111.1  --  -- --    Volume  (mL) (potassium phosphate 15 mmol in  mL ivpb) 111.1 -- 111.1 -- -- --    Total Intake 1044.4 -- 1044.4 -- -- --       Output    Urine  500  575 1075  --  -- --    Number of Times Incontinent of Urine 1 x -- 1 x -- -- --    Urine Void (mL)  -- -- --    Stool  --  -- --  --  -- --    Number of Times Stooled 0 x -- 0 x -- -- --    Total Output  -- -- --       Net I/O     544.4 -575 -30.6 -- -- --              Intake/Output Summary (Last 24 hours) at 9/27/2022 0948  Last data filed at 9/27/2022 0514  Gross per 24 hour   Intake 1044.4 ml   Output 1075 ml   Net -30.6 ml               traMADol  50 mg Q6HRS PRN    morphine injection  2 mg Q3HRS PRN    Or    morphine injection  4 mg Q3HRS PRN    amLODIPine  5 mg Q DAY    omeprazole  20 mg DAILY    Respiratory Therapy Consult   Continuous RT    Pharmacy Consult Request  1 Each PHARMACY TO DOSE    ondansetron  4 mg Q4HRS PRN    ondansetron  4 mg Q4HRS PRN    docusate sodium  100 mg BID    senna-docusate  1 Tablet Nightly    senna-docusate  1 Tablet Q24HRS PRN    polyethylene glycol/lytes  1 Packet BID    magnesium hydroxide  30 mL DAILY    bisacodyl  10 mg Q24HRS PRN    sodium phosphate  1 Each Once PRN    NS   Continuous    levETIRAcetam  500 mg Q12HRS    Or    levETIRAcetam (Keppra) IV  500 mg Q12HRS       Assessment and Plan:  Hospital day #=2 right subacute SDH with minimal midline shift   POD #n/a  Prophylactic anticoagulation: no         Start date/time: TBD    Plan:  Dr Reece to discuss with family draining SDH sometime this week   Follow up Head CT stable  Platelets 77  TEG done   Na stable   UA done, UTI  Appreciate medical management   Keep eunatremic     Addendum: Dr. Reece discussed surgical option vs nonsurgical. Family would like to proceed with nonsurgical. Patient can DC home per NSGY POV.  Cont. Decadron 2mg TID x 10 days with Zantac   F/u in clinic 5-6 weeks with CT head w/o   Questions or concerns please call Jennifer  Neurosurgery [phone]

## 2022-09-27 NOTE — DISCHARGE PLANNING
Case Management Discharge Planning    Admission Date: 9/25/2022  GMLOS: 2  ALOS: 2    6-Clicks ADL Score: 12  6-Clicks Mobility Score: 12      Anticipated Discharge Dispo: Discharge Disposition: D/T to home under HHA care in anticipation of covered skilled care (06)    DME Needed: Unknown, pending PT/OT consult    Action(s) Taken: DC Assessment Complete (See below)    Escalations Completed: None    Medically Clear: No    Next Steps: Pending PT/OT eval for safe discharge recommendations. Plan for home with family assist.     Barriers to Discharge: Medical clearance      Care Transition Team Assessment    Information Source  Orientation Level: Oriented to person, Disoriented to place, Disoriented to time, Disoriented to situation  Information Given By: Other (Comments)  Informant's Name: DaughterKellen  Who is responsible for making decisions for patient? : Patient    I met with the patient and family at the bedside to completed the assessment. The patient lives with his wife, adult daughter Kellen and her family in a single story home with 2 steps to enter. Patient's wife and daughter are the primary support at home. Patient does not have prescription coverage. PCP is Marija Shukla at [facility].     Readmission Evaluation  Is this a readmission?: No    Elopement Risk  Legal Hold: No  Ambulatory or Self Mobile in Wheelchair: No-Not an Elopement Risk  Disoriented: No  Elopement Risk: Not at Risk for Elopement    Interdisciplinary Discharge Planning  Does Admitting Nurse Feel This Could be a Complex Discharge?: No  Lives with - Patient's Self Care Capacity: Spouse, Adult Children  Support Systems: Family Member(s), Spouse / Significant Other  Housing / Facility: 1 Story House  Do You Take your Prescribed Medications Regularly: Yes  Able to Return to Previous ADL's: Yes  Mobility Issues: No  Prior Services: Continuous (24 Hour) Care Giving Family  Patient Prefers to be Discharged to:: Home  with family  Assistance Needed: Yes  Durable Medical Equipment: Not Applicable    Discharge Preparedness  What is your plan after discharge?: Home with help  What are your discharge supports?: Child, Spouse  Prior Functional Level: Needs Assist with Activities of Daily Living    Functional Assesment  Prior Functional Level: Needs Assist with Activities of Daily Living    Finances  Financial Barriers to Discharge: No  Prescription Coverage: No  Prescription Coverage Comments: MCRE A&B no prescription coverage         Values / Beliefs / Concerns  Values / Beliefs Concerns : No    Advance Directive  Advance Directive?: None  Advance Directive offered?: AD Booklet given    Domestic Abuse  Have you ever been the victim of abuse or violence?: Not Sure  Physical Abuse or Sexual Abuse: Unable to Assess due to Patient Condition  Verbal Abuse or Emotional Abuse: Unable to Assess due to Patient Condition  Possible Abuse/Neglect Reported to:: Not Applicable              Anticipated Discharge Information  Discharge Disposition: D/T to home under HHA care in anticipation of covered skilled care (06)

## 2022-09-27 NOTE — PROGRESS NOTES
Assumed care of patient, received report from Aj SICU RN. 2 RN skin check completed, fall precautions in place, daughter at bedside. All questions answered, call light within reach.

## 2022-09-28 PROBLEM — K74.69 OTHER CIRRHOSIS OF LIVER (HCC): Status: ACTIVE | Noted: 2022-01-01

## 2022-09-28 NOTE — DISCHARGE PLANNING
Case Management Discharge Planning    Admission Date: 9/25/2022  GMLOS: 2  ALOS: 3    6-Clicks ADL Score: 6  6-Clicks Mobility Score: 6  PT and/or OT Eval ordered: Yes  Post-acute Referrals Ordered: Yes  Post-acute Choice Obtained: Yes  Has referral(s) been sent to post-acute provider:  Yes      Anticipated Discharge Dispo: Discharge Disposition: D/T to home under HHA care in anticipation of covered skilled care (06)  Discharge Address: [facility]  [address]  Discharge Contact Phone Number: [phone]    DME Needed: home oxygen, wheel chair    Action(s) Taken: Spoke with patient's daughter Kellen and sonLoco in hospital room.  Pt lives with spouse, daughter, Kellen, son-in-law, Gareth and grandchildren in a one story house in [place].  Pt has been having frequent falls at home.  He has 24/7 supervision from family and never left alone.  Pt uses a walker and electric wc at home.  Kellen and Loco stated that patient is incontinent of urine and wears a diaper.  He is able to alert them when he has to have a BM.  He has gotten very weak from laying in bed a lot.  They are trying to walk with him outside more in the yard as they think he wants to get out of bed and walk around or go outside vs laying in bed all the time and this is the cause for his falls.  They think a lap or vest restraints when he is in a chair will help.  Discussed SNF or home with home health.  They do not want SNF and stated that patient would get worse.  They are agreeable to home with home health, home oxygen and wc.  Consent obtained to send referrals to [facility], [facility], and [facility].  Consent obtained to send referrals to [facility] and [facility].  Choice forms faxed to [facility].  Waiting for home oxygen orders.    Patient is underinsured.  Kellen stated that she pays out-of-pocket for medications.  He does not qualify for Medicaid and they have not applied for Medicare Part D.  Information provided on  enrollment.    Medically Clear: Yes    Next Steps: Follow up on home health and DME referrals.    Barriers to Discharge: Medical clearance, home health acceptance, DME delivery.    Care Transition Team Assessment    Information Source  Orientation Level: Oriented to person  Information Given By: Relative  Informant's Name: Loco Jiménez  Who is responsible for making decisions for patient? : Adult child  Name(s) of Primary Decision Maker: Kellen Ventura    Readmission Evaluation  Is this a readmission?: No    Elopement Risk  Legal Hold: No  Ambulatory or Self Mobile in Wheelchair: No-Not an Elopement Risk  Disoriented: Place-At Risk for Elopement, Time-At Risk for Elopement, Situation-At Risk for Elopement  Psychiatric Symptoms: None  History of Wandering: No  Elopement this Admit: No  Vocalizing Wanting to Leave: No  Displays Behaviors, Body Language Wanting to Leave: No-Not at Risk for Elopement  Elopement Risk: Not at Risk for Elopement    Interdisciplinary Discharge Planning  Does Admitting Nurse Feel This Could be a Complex Discharge?: No  Lives with - Patient's Self Care Capacity: Spouse, Adult Children, Other (Comments)  Support Systems: Family Member(s), Spouse / Significant Other  Housing / Facility: [address]  Do You Take your Prescribed Medications Regularly: Yes  Able to Return to Previous ADL's: Yes  Mobility Issues: No  Prior Services: Continuous (24 Hour) Care Giving Family  Patient Prefers to be Discharged to:: Home with family  Assistance Needed: Yes  Durable Medical Equipment: Not Applicable    Discharge Preparedness  What is your plan after discharge?: Home health care, Home with help  What are your discharge supports?: Child, Spouse  Prior Functional Level: Ambulatory, Bladder Incontinence, Bowel Incontinence, Needs Assist with Activities of Daily Living, Needs Assist with Medication Management, Uses Walker, Uses Wheelchair  Difficulity with ADLs: Bathing, Brushing teeth, Dressing,  Eating, Toileting, Walking  Difficulity with IADLs: Cooking, Driving, Keeping track of finances, Laundry, Managing medication, Shopping, Using the telephone or computer    Functional Assesment  Prior Functional Level: Ambulatory, Bladder Incontinence, Bowel Incontinence, Needs Assist with Activities of Daily Living, Needs Assist with Medication Management, Uses Walker, Uses Wheelchair    Finances  Financial Barriers to Discharge: No  Prescription Coverage: No  Prescription Coverage Comments: MCRE A&B no prescription coverage         Values / Beliefs / Concerns  Values / Beliefs Concerns : No    Advance Directive  Advance Directive?: None  Advance Directive offered?: AD Booklet given    Domestic Abuse  Have you ever been the victim of abuse or violence?: Not Sure  Physical Abuse or Sexual Abuse: Unable to Assess due to Patient Condition  Verbal Abuse or Emotional Abuse: Unable to Assess due to Patient Condition  Possible Abuse/Neglect Reported to:: Not Applicable         Discharge Risks or Barriers  Discharge risks or barriers?: Uninsured / underinsured  Patient risk factors: Cognitive / sensory / physical deficit, Uninsured or underinsured    Anticipated Discharge Information  Discharge Disposition: D/T to home under A care in anticipation of covered skilled care (06)  Discharge Address: [address] [place] [address]  [address]  Discharge Contact Phone Number: [phone]

## 2022-09-28 NOTE — THERAPY
Physical Therapy   Initial Evaluation     Patient Name: Jesse Conway  Age:  87 y.o., Sex:  male  Medical Record #: [id number]  Today's Date: 9/28/2022    Precautions: Fall Risk;Swallow Precautions ( See Comments)    Assessment  Patient is 87 y.o. male who presented acutely with ALOC and GLF ~4 days prior to admit, found to have SDH.  Early on 9/28 patient developed increased confusion, slurred speech, decreased command following, & increased weakness, no acute changes noted with stat CT.  PMH includes dementia.  Today patient was limited by decreased cognition, activity tolerance, strength, balance, safety awareness, and functional mobility.  Patient mobilized as detailed below, required max-total A for bed mobility and to maintain seated balance.  Patient demonstrated poor command following and activity tolerance.  Educated patient's son on role of acute PT and DC recommendations.  Recommend continued acute skilled PT and post acute placement.    Plan    Recommend Physical Therapy 3 times per week until therapy goals are met for the following treatments:  Bed Mobility, Equipment, Gait Training, Neuro Re-Education / Balance, Sensory Integration Techniques, Therapeutic Activities, and Therapeutic Exercises    DC Equipment Recommendations: Unable to determine at this time  Discharge Recommendations: Recommend post-acute placement for additional physical therapy services prior to discharge home     Objective     09/28/22 1023   Precautions   Precautions Fall Risk;Swallow Precautions ( See Comments)   Prior Living Situation   Prior Services Continuous (24 Hour) Care Giving Family   Housing / Facility 1 Story House   Steps Into Home 2   Equipment Owned Single Point Cane   Lives with - Patient's Self Care Capacity Spouse;Adult Children;Other (Comments)   Comments Pt's son provided information: pt lives with his spouse, dtr, son in law, and grandchildren.  Dtr is primary caregiver   Prior Level of Functional Mobility  "  Bed Mobility Independent   Transfer Status Required Assist   Ambulation Required Assist   Assistive Devices Used Single Point Cane   Stairs Required Assist   Comments Son reported pt was able to move very short distances without assistance, e.g. to reach for water bottle but required assist for OOB mobility.  Son reported that son in law or grandsons primarily assist with mobility (grandsons >17 y/o)   History of Falls   History of Falls Yes (Pt's son reported numerous falls)   Cognition    Cognition / Consciousness X   Ability To Follow Commands 1 Step (<25% of time)   Safety Awareness Impaired;Impulsive   New Learning Impaired   Attention Impaired   Initiation Impaired   Comments Poor command following, responded \"no\" with very slurred speech to all cues   Passive ROM Lower Body   Passive ROM Lower Body WDL   Active ROM Lower Body    Active ROM Lower Body  X   Comments Unable to follow commands for formal assessment   Strength Lower Body   Comments Unabe to assess due to decreased command following   Sensation Lower Body   Comments Unabe to assess due to decreased command following   Balance Assessment   Sitting Balance (Static) Trace   Sitting Balance (Dynamic) Dependent   Comments Further mobility deferred, unsafe at this time   Gait Analysis   Gait Level Of Assist Unable to Participate   Bed Mobility    Supine to Sit Total Assist   Sit to Supine Maximal Assist   Scooting Total Assist   Rolling Total Assist to Lt.;Total Assist to Rt.   Functional Mobility   Sit to Stand Unable to Participate   Comments max A for anterior wt shift, unable to clear buttocks   Activity Tolerance   Sitting Edge of Bed ~5 min   Short Term Goals    Short Term Goal # 1 Pt will perform supine <> sit with mod A within 6 visits to progress bed mobility   Short Term Goal # 2 Pt will maintain fair - seated balance x 3 min within 6 visits to progress functional balance   Short Term Goal # 3 Pt will peform STS & functional transfers with " mod A within 6 visits to progress OOB mobility   Anticipated Discharge Equipment and Recommendations   DC Equipment Recommendations Unable to determine at this time   Discharge Recommendations Recommend post-acute placement for additional physical therapy services prior to discharge home

## 2022-09-28 NOTE — DISCHARGE PLANNING
Case Management Discharge Planning    Admission Date: 9/25/2022  GMLOS: 2  ALOS: 3    6-Clicks ADL Score: 6  6-Clicks Mobility Score: 6    Anticipated Discharge Dispo: Discharge Disposition: D/T to home under HHA care in anticipation of covered skilled care (06)  Discharge Address: [address] [place] [address] [place]  [address]  Discharge Contact Phone Number: [phone]    DME Needed: home oxygen, wheel chair    Action(s) Taken: Choice form faxed to [facility] for [employer] for home oxygen.    Medically Clear: Yes    Next Steps: Follow up on home health, wheel chair and oxygen referrals.    Barriers to Discharge: home health acceptance, DME delivery

## 2022-09-28 NOTE — THERAPY
Occupational Therapy   Initial Evaluation     Patient Name: Jesse Conway  Age:  87 y.o., Sex:  male  Medical Record #: [id number]  Today's Date: 9/28/2022     Precautions  Precautions: Fall Risk, Swallow Precautions ( See Comments)    Assessment  Patient, admitted 2/p GLF resulting in SDH with nonsurgical management, seen for OT eval necessitating Total A supported EOB and in bed ADLs and Max/total basic mobility.     Plan    Recommend Occupational Therapy 3 times per week until therapy goals are met for the following treatments:  Adaptive Equipment, Self Care/Activities of Daily Living, Therapeutic Activities, and Therapeutic Exercises.    DC Equipment Recommendations: Unable to determine at this time  Discharge Recommendations: Recommend post-acute placement for additional occupational therapy services prior to discharge home (would benefit from clarity from family about prior level and level of supervision/ assist)       Objective       09/28/22 0835   Prior Living Situation   Prior Services Continuous (24 Hour) Care Giving Family   Housing / Facility [address]   Lives with - Patient's Self Care Capacity Spouse;Adult Children   Comments Nurse and chart report dtr provides caregiving. Unsure to what extent supervision / assist. Would benefit from clarify from family   Cognition    Cognition / Consciousness X   Comments increased time and cues for all requests, poor safety awareness and command following. Possible gains as day progresses.   Balance Assessment   Sitting Balance (Static) Trace   Sitting Balance (Dynamic) Dependent   Comments unsafe to progress beyond supported EOB at this time   Bed Mobility    Supine to Sit Total Assist   Comments patient participated as able however < 25% most of mobility opportunities   ADL Assessment   Eating   (NT)   Grooming Total Assist   Bathing Total Assist   Upper Body Dressing Total Assist   Lower Body Dressing Total Assist   Toileting Total Assist  (condom cath)    Comments nurse arrive end of session and assist  (patient participated as able however < 25%)   Patient / Family Goals   Patient / Family Goal #1 none stated   Short Term Goals   Short Term Goal # 1 Mod A toileting   Short Term Goal # 2 Mod A toilet transfer   Short Term Goal # 3 Mod A LB ADLs   Short Term Goal # 4 Mod A grooming

## 2022-09-28 NOTE — FACE TO FACE
Face to Face Note  -  Durable Medical Equipment    Roberto Neri M.D. - NPI: [id number]  I certify that this patient is under my care and that they had a durable medical equipment(DME)face to face encounter by myself that meets the physician DME face-to-face encounter requirements with this patient on:    Date of encounter:   Patient:                    MRN:                       YOB: 2020  Jesse Conway  [id number]  1935     The encounter with the patient was in whole, or in part, for the following medical condition, which is the primary reason for durable medical equipment:  Other - SDH    I certify that, based on my findings, the following durable medical equipment is medically necessary:    Wheelchair   Patient needs manual wheelchair for use inside the home based on the above diagnosis. Per guidelines patient meets criteria in the following ways:   A.  Patient has significant impairment in the following Toileting, Dressing, and Bathing and is places the patient at a heightened risk of morbidity.   B.  The patient's mobility limitations cannot be sufficiently resolved by use of  fitted cane or walker.   C.  The patient reports his home provides adequate access between rooms,  maneuvering space, and surfaces for use of the manual wheelchair that is  provided.   D.  The use of the manual wheelchair will significantly improve the patient's  ability to participate in MRADLs and the patient will use it on a regular basis in  the home.   E.  The patient has not expressed an unwillingness to use the manual  wheelchair.   F. The patient has limitations of strength, endurance, range of motion, or coordination per OT notes:.    My Clinical findings support the need for the above equipment due to:  Abnormal Gait

## 2022-09-28 NOTE — FACE TO FACE
"Face to Face Note  -  Durable Medical Equipment    Roberto Neri M.D. - NPI: [id number]  I certify that this patient is under my care and that they had a durable medical equipment(DME)face to face encounter by myself that meets the physician DME face-to-face encounter requirements with this patient on:    Date of encounter:   Patient:                    MRN:                       YOB: 2022  Jesse Conway  [id number]  1935     The encounter with the patient was in whole, or in part, for the following medical condition, which is the primary reason for durable medical equipment:  Cardiac Disease and Other - SDH    I certify that, based on my findings, the following durable medical equipment is medically necessary:    Oxygen   HOME O2 Saturation Measurements:(Values must be present for Home Oxygen orders)  Room air sat at rest: 86  Room air sat with amb:  (AASHISH  - pt not ambulatory)  With liters of O2: 2, O2 sat at rest with O2: 95   , O2 sat with amb with O2 :  (AASHISH - pt unable to ambulation)  Is the patient mobile?: No  If patient feels more short of breath, they can go up to 6 liters per minute and contact healthcare provider.    Supporting Symptoms: The patient requires supplemental oxygen, as the following interventions have been tried with limited or no improvement: \"Incentive spirometry.    My Clinical findings support the need for the above equipment due to:  Hypoxia  "

## 2022-09-28 NOTE — FACE TO FACE
Face to Face Supporting Documentation - Home Health    The encounter with this patient was in whole or in part the primary reason for home health admission.    Date of encounter:   Patient:                    MRN:                       YOB: 2020  Jesse Conway  [id number]  1935     Home health to see patient for:  Skilled Nursing care for assessment, interventions & education, Physical Therapy evaluation and treatment, Occupational therapy evaluation and treatment, and Speech Language Pathology evaluation and treatment    Skilled need for:  New Onset Medical Diagnosis SDH    Skilled nursing interventions to include:  Comment: med management and monitoring    Homebound status evidenced by:  Needs the assistance of another person in order to leave the home. Leaving home requires a considerable and taxing effort. There is a normal inability to leave the home.    Community Physician to provide follow up care: ESTEPHANIA LopezNJenniferP.     Optional Interventions? No      I certify the face to face encounter for this home health care referral meets the CMS requirements and the encounter/clinical assessment with the patient was, in whole, or in part, for the medical condition(s) listed above, which is the primary reason for home health care. Based on my clinical findings: the service(s) are medically necessary, support the need for home health care, and the homebound criteria are met.  I certify that this patient has had a face to face encounter by myself.  Roberto Neri M.D. - ANGELI: [id number]

## 2022-09-28 NOTE — DISCHARGE SUMMARY
Discharge Summary    CHIEF COMPLAINT ON ADMISSION  Chief Complaint   Patient presents with    ALOC     Last known well 1hr prior to arrival. Pt was at home with family when he became more lethargic and had to sit down.  EMS called. On arrival BP 80/50.  Pt given 400ml fluid by EMS which improved BP to 120s.  Glood sugar for .  Pt has history of dementia so baseline is to be A&Ox2-3.        Reason for Admission  ems     Admission Date  9/25/2022    CODE STATUS  Full Code    HPI & HOSPITAL COURSE    87 y.o. male who presented 9/25/2022 with a previous medical history that includes dementia, GERD, and hypertension.     Patient was admitted by the trauma service on September 25.  He had been having worsening mental status and difficulty walking for approximately 4 days prior to his presentation.  CT done in the ED demonstrated a 1.7cm subdural hematoma in the right hemisphere consistent with BIG 3.  He was admitted to the ICU with consultation from neurosurgery and subsequently transferred to neurology floor.  Patient clinically improved and after lengthy discussion with the patient's family and neurosurgery they decided to treat the patient conservatively given his multiple comorbidities including thrombocytopenia and liver cirrhosis.  Neurosurgery recommended 10-day course of Decadron.  Patient has underlying dementia was having waxing mental status repeat head CT last night was unchanged.  He was evaluated by PT OT with recommendation for SNF.  Discussed discharge planning this morning with patient's son and daughter they would like to take him home as he has 24/7 family caregivers so we will set him up for home health.  I have ordered a wheelchair and home oxygen.  I discussed with the patient's daughter and son CODE STATUS at this time they would like to continue with full code.  Patient was started on amlodipine for hypertension he will be on Keppra for seizure prophylaxis for 7 days and was started on  empiric Prilosec while on high-dose Decadron to decrease risk of GI bleed      Therefore, he is discharged in fair and stable condition to home with close outpatient follow-up.    The patient met 2-midnight criteria for an inpatient stay at the time of discharge.    Discharge Date  9/28/2022    FOLLOW UP ITEMS POST DISCHARGE  Follow-up with PCP with repeat chemistry and CBC panels and follow-up on blood pressure  Follow-up with neurosurgery in 4 to 6 weeks with repeat head CT      DISCHARGE DIAGNOSES  Principal Problem:    Traumatic subdural hematoma, initial encounter (HCC) POA: Yes  Active Problems:    Hypertension POA: Yes    GERD (gastroesophageal reflux disease) POA: Yes    Depressive disorder POA: Yes    Contraindication to deep vein thrombosis (DVT) prophylaxis POA: Yes    Trauma POA: Yes    History of dementia POA: Yes    Thrombocytopenia (HCC) POA: Yes    Hypophosphatemia POA: Yes    SunDown syndrome POA: Yes    Subdural hematoma (HCC) POA: Yes    Elevated LFTs POA: Unknown    Other cirrhosis of liver (HCC) POA: Unknown  Resolved Problems:    * No resolved hospital problems. *      FOLLOW UP  No future appointments.  Marija Shukla, JUAN ANTONIOP.  [address] [facility] [address]  [phone]    Follow up      Popeye Reece M.D.  [address] [facility] [address]  [phone]    Follow up      Jacqueline Reece  [address] [facility] [address] [phone]          MEDICATIONS ON DISCHARGE     Medication List        START taking these medications        Instructions   acetaminophen 325 MG Tabs  Commonly known as: Tylenol   Take 1 Tablet by mouth every four hours as needed for Mild Pain or Moderate Pain.  Dose: 325 mg     amLODIPine 5 MG Tabs  Commonly known as: NORVASC   Take 1 Tablet by mouth every day.  Dose: 5 mg     dexamethasone 2 MG tablet  Commonly known as: DECADRON   Take 1 Tablet by mouth every 8 hours for 9 days.  Dose: 2 mg     levETIRAcetam 500 MG Tabs  Commonly known as:  KEPPRA   Take 1 Tablet by mouth every 12 hours for 5 days.  Dose: 500 mg     omeprazole 20 MG delayed-release capsule  Commonly known as: PRILOSEC   Take 1 Capsule by mouth every day for 14 days.  Dose: 20 mg              Allergies  Allergies   Allergen Reactions    Pcn [Penicillins] Rash and Nausea     .> 70 years ago       DIET  Orders Placed This Encounter   Procedures    Diet Order Diet: Level 5 - Minced and Moist; Liquid level: Level 0 - Thin; Tray Modifications (optional): No Straws, SLP - 1:1 Supervision by Nursing, SLP - Deliver to Nursing Station     Standing Status:   Standing     Number of Occurrences:   1     Order Specific Question:   Diet:     Answer:   Level 5 - Minced and Moist [24]     Order Specific Question:   Liquid level     Answer:   Level 0 - Thin     Order Specific Question:   Tray Modifications (optional)     Answer:   No Straws     Order Specific Question:   Tray Modifications (optional)     Answer:   SLP - 1:1 Supervision by Nursing     Order Specific Question:   Tray Modifications (optional)     Answer:   SLP - Deliver to Nursing Station       ACTIVITY  As tolerated.  Weight bearing as tolerated    CONSULTATIONS  Neurosurgery    PROCEDURES      LABORATORY  Lab Results   Component Value Date    SODIUM 135 09/28/2022    POTASSIUM 4.3 09/28/2022    CHLORIDE 106 09/28/2022    CO2 21 09/28/2022    GLUCOSE 185 (H) 09/28/2022    BUN 9 09/28/2022    CREATININE 0.46 (L) 09/28/2022    CREATININE 0.85 04/30/2010    GLOMRATE >59 04/30/2010        Lab Results   Component Value Date    WBC 3.2 (L) 09/28/2022    HEMOGLOBIN 11.3 (L) 09/28/2022    HEMATOCRIT 34.0 (L) 09/28/2022    PLATELETCT 75 (L) 09/28/2022        Total time of the discharge process exceeds 35 minutes.

## 2022-09-28 NOTE — PROGRESS NOTES
NOC HOSPITALIST CROSS COVER    Notified by RN regarding worsening confusion.  The patient does have a history of dementia, but he is far more confused than he was the night prior according to the bedside RN.  The patient was not responding to questions appropriately and was slurring his speech more than usual, was not following commands consistently, and did have increased weakness of the left upper extremity.  Attended patient at bedside, patient was noted to be inattentive, turning head side to side, with tongue sticking out on the leftward deviation.  Bedside RN utilized for translation purposes and stated that the patient was not responding appropriately to questions.  Repeated questioning resulted in patient answering questions more appropriately than before, but still alert and oriented to self and intermittently oriented to place.     Who was initially admitted with a ground-level fall that resulted in a bilateral subdural hematoma.  Neurosurgery was consulted, but the family wants to pursue medical management versus acute surgical management.      Vitals:    09/27/22 2247   BP: 136/60   Pulse:    Resp:    Temp:    SpO2:           Plan:  #Altered mental status  -Although patient does have a history of dementia, his mentation and weakness are concerning for evolution of his subdural hematoma.  We will obtain a stat head CT to rule out worsening of the subdural hematoma  -Fingerstick glucose 151  -Fall precautions      Interventions to be considered in all patients in order to minimize the risk of delirium.   -do not disturb the patient (vitals or lab draws) between the hours of 10 PM and 6 AM.  -ideally the patient should not sleep during the day and we should avoid day time naps.   -up in chair for meals  -watch for constipation  -timed voiding - ask patient if they would like to go to the bathroom q 2-3 hours, except during the do not disturb hours.   -remove all unnecessary lines (central lines, peripheral  IVs, feeding tubes, estrella catheters)  -minimize polypharmacy, if possible, medications should not be dosed during sleep hours          -----------------------------------------------------------------------------------------------------------    Electronically signed by:  JESS Valdez [facility]-BC  Hospitalist Services

## 2022-09-28 NOTE — PROGRESS NOTES
0200: This RN in to turn patient. Upon walking to room patient noted to have taken off blankets and noted to be grabbing onto  cable. Asked patient if he was ok, patient nodding head from left to right. Continued to ask patient if he was ok however, patient appeared to not understand the question continuing to nod head left to right. Tongue noted to be sticking out as well.   Patient asked to squeeze my hands however, did not follow command. Continuously asked patient to squeeze hands and after repeating command numerous times patient was able to squeeze however, only squeezed with right side. After a few minutes patient slightly more attentive.   This RN continued to ask patient to raise arms up. Patient noted to have only raised up right arm. When asked to raise left arm, patient only able to lift arm slightly off the bed.   Asked patient orientation questions. Unable to understand patient's response due to speech being more slurred than normal, possibly due to tongue sticking out.    Blood sugar checked. Blood sugar result 151  On call hospitalist RNMicheline, notified regarding concerns. Orders received for STAT head CT.   Patient taken down to head CT and returned to room.

## 2022-09-28 NOTE — PROGRESS NOTES
"Patient seen resting comfortably in bed with family at bedside.   Plan of care discussed with family.   Will continue 2mg PO decadron and anticipate NPO status at midnight for RUQ ultrasound. Family verbalizes understanding. Per daughter, patient has had issues with gallbladder in the past.   Patient primarily Setswana speaking.   Patient noted to be Aox1, oriented to self only. Stating \"no\" when asked if pain present.   Patient able to take medications crushed in puree.   Q2 hour turns in place.  All needs addressed at this time.  Call light within reach, bed locked and in lowest position and hourly rounding in place.   "

## 2022-09-28 NOTE — CARE PLAN
The patient is Watcher - Medium risk of patient condition declining or worsening    Shift Goals  Clinical Goals: q4 hour neuro checks;  Patient Goals: AASHISH: (Pt. confused;)  Family Goals: Comfort;    Problem: Fall Risk  Goal: Patient will remain free from falls  Outcome: Progressing  Note: Patient at high risk for falls due to left sided weakness and previous fall prior to admission. Assured all fall precautions (I.e. bed alarm, treaded slipper socks, etc.) in place.      Problem: Neuro Status  Goal: Neuro status will remain stable or improve  Outcome: Progressing  Note: Patient admitted for traumatic subdural hematomas. Q4 hour neuro checks in place. Patient noted to have neuro changes. STAT head CT obtained. Will continue to monitor neuro status.

## 2022-09-28 NOTE — DISCHARGE PLANNING
Received Choice form at 1300  Agency/Facility Name: [facility]  Referral sent per Choice form @ 1308     1310:  Received Choice form at 1230  Agency/Facility Name: [facility]  Referral sent per Choice form @ 1310     1435:  Agency/Facility Name: [facility]  Spoke To: James  Outcome: DPA was notified Pt has been accepted and order is currently being processed. James states DME will be delivered from 1530 to 1600.     RN CM notified.

## 2022-09-28 NOTE — DISCHARGE PLANNING
Case Management Discharge Planning    Admission Date: 9/25/2022  GMLOS: 2  ALOS: 3    6-Clicks ADL Score: 6  6-Clicks Mobility Score: 6  PT and/or OT Eval ordered: Yes  Post-acute Referrals Ordered: Yes  Post-acute Choice Obtained: Yes  Has referral(s) been sent to post-acute provider:  Yes      Anticipated Discharge Dispo: Discharge Disposition: D/T to home under HHA care in anticipation of covered skilled care (06)  Discharge Address: [facility]  [address]  Discharge Contact Phone Number: [phone]    DME Needed: home oxygen, wheel chair    Action(s) Taken: received vm from [facility].  Unable to accept because of staffing.  Referral sent to [facility].    Medically Clear: Yes    Next Steps: Follow up with [facility].    Barriers to Discharge: Home health acceptance, DME delivery    9-28-22/1633  Daughter Kellen can with paying out-of-pocket for medications ($63.01) at [facility].  Portable oxygen concentrator delivered to bedside by James Purdy.  [facility] reviewing referral.  Will not have determination until a.m.  Voalte msg to Dr. Angy Neri and bedside RNAna.  Ok to AR without home health acceptance.  RN FIGUEROA and SUZANNE will follow up.

## 2022-09-29 NOTE — DISCHARGE INSTRUCTIONS
Discharge Instructions    Discharged to home by car with relative. Discharged via wheelchair, hospital escort: Yes.  Special equipment needed: Not Applicable    Be sure to schedule a follow-up appointment with your primary care doctor or any specialists as instructed.     Discharge Plan:   Diet Plan: Discussed  Activity Level: Discussed  Confirmed Follow up Appointment: Patient to Call and Schedule Appointment  Confirmed Symptoms Management: Discussed  Medication Reconciliation Updated: Yes  Influenza Vaccine Indication: Patient Refuses    I understand that a diet low in cholesterol, fat, and sodium is recommended for good health. Unless I have been given specific instructions below for another diet, I accept this instruction as my diet prescription.   Other diet: Minced and moist foods, thin liquids    Special Instructions: None    -Is this patient being discharged with medication to prevent blood clots?  No    Is patient discharged on Warfarin / Coumadin?   No     Subdural Hematoma    A subdural hematoma is a collection of blood between the brain and its outer covering (dura). As the amount of blood increases, pressure builds on the brain.  There are two types of subdural hematomas:  Acute. This type develops shortly after a hard, direct hit to the head and causes blood to collect very quickly. This is a medical emergency. If it is not diagnosed and treated quickly, it can lead to severe brain injury or death.  Chronic. This is when bleeding develops more slowly, over weeks or months. In some cases, this type does not cause symptoms.  What are the causes?  This condition is caused by bleeding (hemorrhage) from a broken (ruptured) blood vessel. In most cases, a blood vessel ruptures and bleeds because of a head injury, such as from a hard, direct hit. Head injuries can happen in car accidents, falls, assaults, or while playing sports.  In rare cases, a hemorrhage can happen without a known cause (spontaneously),  especially if you take blood thinners (anticoagulants).  What increases the risk?  This condition is more likely to develop in:  Older people.  Infants.  People who take blood thinners.  People who have head injuries.  People who abuse alcohol.  What are the signs or symptoms?  Symptoms of this condition can vary depending on the size of the hematoma. Symptoms can be mild, severe, or life-threatening. They include:  Headaches.  Nausea or vomiting.  Changes in vision, such as double vision or loss of vision.  Changes in speech or trouble understanding what people say.  Loss of balance or trouble walking.  Weakness, numbness, or tingling in the arms or legs, especially on one side of the body.  Seizures.  Change in personality.  Increased sleepiness.  Memory loss.  Loss of consciousness.  Coma.  Symptoms of acute subdural hematoma can develop over minutes or hours. Symptoms of chronic subdural hematoma may develop over weeks or months.  How is this diagnosed?  This condition is diagnosed based on the results of:  A physical exam.  Tests of strength, reflexes, coordination, senses, manner of walking (gait), and facial and eye movements (neurological exam).  Imaging tests, such as an MRI or a CT scan.  How is this treated?  Treatment for this condition depends on the type of hematoma and how severe it is.  Treatment for acute hematoma may include:  Emergency surgery to drain blood or remove a blood clot.  Medicines that help the body get rid of excess fluids (diuretics). These may help to reduce pressure in the brain.  Assisted breathing (ventilation).  Treatment for chronic hematoma may include:  Observation and bed rest at the hospital.  Surgery.  If you take blood thinners, you may need to stop taking them for a short time. You may also be given anti-seizure (anticonvulsant) medicine.  Sometimes, no treatment is needed for chronic subdural hematoma.  Follow these instructions at home:  Activity  Avoid situations  where you could injure your head again, such as in competitive sports, downhill snow sports, and horseback riding. Do not do these activities until your health care provider approves.  Wear protective gear, such as a helmet, when participating in activities such as biking or contact sports.  Avoid too much visual stimulation while recovering. This means limiting how much you read and limiting your screen time on a smart phone, tablet, computer, or TV.  Rest as told by your health care provider. Rest helps the brain heal.  Try to avoid activities that cause physical or mental stress. Return to work or school as told by your health care provider.  Do not lift anything that is heavier than 5 lb (2.3 kg), or the limit you are told, until your health care provider says that it is safe.  Do not drive, ride a bike, or use heavy machinery until your health care provider approves.  Always wear your seat belt when you are in a motor vehicle.  Alcohol use  Do not drink alcohol if your health care provider tells you not to drink.  If you drink alcohol, limit how much you use to:  0-1 drink a day for women.  0-2 drinks a day for men.  General instructions  Monitor your symptoms, and ask people around you to do the same. Recovery from brain injuries varies. Talk with your health care provider about what to expect.  Take over-the-counter and prescription medicines only as told by your health care provider. Do not take blood thinners or NSAIDs unless your health care provider approves. These include aspirin, ibuprofen, naproxen, and warfarin.  Keep your home environment safe to reduce the risk of falling.  Keep all follow-up visits as told by your health care provider. This is important.  Where to find more information  National [place] of Neurological Disorders and Stroke: www.ninds.nih.gov  American Academy of Neurology (AAN): www.aan.com  Brain Injury Association of [place]: www.biausa.org  Get help right away if you:  Are  taking blood thinners and you fall or you experience minor trauma to the head. If you take any blood thinners, even a very small injury can cause a subdural hematoma.  Have a bleeding disorder and you fall or you experience minor trauma to the head.  Develop any of the following symptoms after a head injury:  Clear fluid draining from your nose or ears.  Nausea or vomiting.  Changes in speech or trouble understanding what people say.  Seizures.  Drowsiness or a decrease in alertness.  Double vision.  Numbness or inability to move (paralysis) in any part of your body.  Difficulty walking or poor coordination.  Difficulty thinking.  Confusion or forgetfulness.  Personality changes.  Irrational or aggressive behavior.  These symptoms may represent a serious problem that is an emergency. Do not wait to see if the symptoms will go away. Get medical help right away. Call your local emergency services (911 in the [place] Do not drive yourself to the hospital.  Summary  A subdural hematoma is a collection of blood between the brain and its outer covering (dura).  Treatment for this condition depends on what type of subdural hematoma you have and how severe it is.  Symptoms can vary from mild to severe to life-threatening.  Monitor your symptoms, and ask others around you to do the same.  This information is not intended to replace advice given to you by your health care provider. Make sure you discuss any questions you have with your health care provider.  Document Released: 11/04/2005 Document Revised: 11/18/2019 Document Reviewed: 11/18/2019  [employer] Patient Education © 2020 Elsevier Inc.

## 2022-09-29 NOTE — DISCHARGE PLANNING
Agency/Facility Name: [facility]  Spoke To: Tamra  Outcome: DPA was notified Pt has been accepted for HH services. DPA notified HH Pt d/c'd yesterday, 9/28. HH to reach out to Pt.     RN CM notified.

## 2022-10-03 PROBLEM — N39.0 UTI (URINARY TRACT INFECTION): Status: ACTIVE | Noted: 2022-01-01

## 2022-10-03 PROBLEM — K72.91 HEPATIC COMA (HCC): Status: ACTIVE | Noted: 2022-01-01

## 2022-10-03 PROBLEM — R62.7 FAILURE TO THRIVE IN ADULT: Status: ACTIVE | Noted: 2022-01-01

## 2022-10-03 PROBLEM — R41.82 AMS (ALTERED MENTAL STATUS): Status: ACTIVE | Noted: 2022-01-01

## 2022-10-03 NOTE — PROGRESS NOTES
4 Eyes Skin Assessment Completed by GIAN Farrar and GIAN García.    Head WDL  Ears WDL  Nose NGT To right nare and Nasopharyngeal airway to left nare  Mouth WDL  Neck WDL  Breast/Chest WDL  Shoulder Blades Bruise to left shoulder  Spine WDL  (R) Arm/Elbow/Hand Edema  (L) Arm/Elbow/Hand Edema  Abdomen Scab  Groin Redness and Blanching  Scrotum/Coccyx/Buttocks Redness and Blanching  (R) Leg Edema  (L) Leg Edema  (R) Heel/Foot/Toe Discoloration and Edema  (L) Heel/Foot/Toe Discoloration, Scab, and Edema          Devices In Places Blood Pressure Cuff, Pulse Ox, OG/NG, and Nasal Cannula NPA L nare      Interventions In Place Gray Ear Foams, Waffle Overlay, Pillows, Q2 Turns, and Pressure Redistribution Mattress    Possible Skin Injury No    Pictures Uploaded Into Epic N/A  Wound Consult Placed N/A  RN Wound Prevention Protocol Ordered Yes

## 2022-10-03 NOTE — ED NOTES
Patient given medications per MAR, including lactulose and rifaximin since radiologist has now confirmed tube placement.

## 2022-10-03 NOTE — ASSESSMENT & PLAN NOTE
With chronic cirrhosis of the liver patient has thrombocytopenia  Currently no bleeding noted  Does have some petechia and bruising

## 2022-10-03 NOTE — PROCEDURES
INPATIENT ROUTINE VIDEO ELECTROENCEPHALOGRAM REPORT      REFERRING PROVIDER: Dr. Dawson    DOS: 10/3/2022     TOTAL RECORDING TIME: 0 hours and 23 minutes of total recording time    INDICATION:  Jesse Conway 87 y.o. male presenting with altered mental status    CURRENT ANTI-SEIZURE MEDICATIONS:   keppra  .  OTHER PERTINENT MEDICATIONS:   None    TECHNIQUE: Routine VEEG was set up by a Neurodiagnostic technologist who performed education to the patient and staff. A minimum of 23 electrodes and 23 channel recording was setup and performed by Neurodiagnostic technologist, in accordance with the international 10-20 system. The study was reviewed in bipolar and referential montages. The recording examined the patient in the  drowsy/sleep and/or comatose state(s).     DESCRIPTION OF THE RECORD:  The background was EEG BACKGROUND CONTINUITY: continuous, BACKGROUND SYMMETRY: symmetrical, and EEG PDR: was without a definite posterior dominant rhythm The background was composed of mostly delta>theta . Reactivity was absent. Spontaneous variability was present. State changes were absent.   EEG Sleep: N2 sleep architecture was absent.      ACTIVATION PROCEDURES:   NA    ICTAL AND INTERICTAL FINDINGS:   No focal or generalized epileptiform activity noted.     No regional slowing was seen during this routine study.      No definite electrographic or electroclinical seizures.     EKG: Sampling of the EKG recording showed sinus rhythm      EVENTS:  None    INTERPRETATION:   Abnormal video EEG recording in the drowsy/sleep and/or comatose state(s):  - Severe background slowing suggestive of diffuse/multifocal cerebral dysfunction and consistent with a severe nonspecific encephalopathy. Clinical and radiographic correlation recommended.   - No persistent focal asymmetries seen.  - Epileptiform discharges: No epileptiform discharges or other epileptiform phenomena seen.   - No seizures. Clinical correlation is  recommended.      Note: This EEG does not rule the possibility of seizures  If the clinical suspicion remains high for seizures, a prolonged recording to capture clinical or subclinical events may be helpful.        Eron Vines MD  Department of Neurology at [facility]  General Neurologist and Epileptologist  Director of [facility] Level III Comprehensive Epilepsy Program  Professor of Clinical Neurology, [facility].   Phone: [phone]  Fax: [phone]  E-mail: [email or web address]

## 2022-10-03 NOTE — ASSESSMENT & PLAN NOTE
"Await palliative care consult  Doubtful that this patient will make a successful recovery given his multiple insults including cirrhosis and subdural hematoma\"    Supplements ordered  "

## 2022-10-03 NOTE — ASSESSMENT & PLAN NOTE
"Patient presents with holohemispheric subdural hematoma  Patient at this point is obtunded and has not been responding at home.  Patient will not respond to even painful stimuli  Seizure prevention with Keppra continue with  Decadron continued for decreasing cerebral edema  Daughter and I had extensive discussion at bedside, she understood from previous hospitalization the patient would be able to just return to normal activity as before.  I explained to her that with the ongoing condition it is doubtful that the patient will resume normal activity as previous.  Patient's subdural hematoma has not worsened however his mental status and neurological status has definitely deteriorated.\"    Hospice upon discharge, DNR/DNI for now at the hospital  Hospice pending  "

## 2022-10-03 NOTE — H&P
"Hospital Medicine History & Physical Note    Date of Service  10/3/2022    Primary Care Physician  BHAVNA Lopez.        Code Status  DNAR/DNI    Chief Complaint  Chief Complaint   Patient presents with    ALOC     Patient found unresponsive by family, Last known well @ 1am yesterday.       History of Presenting Illness  Jesse Conway is a 87 y.o. male with past medical history of recent admission for bilateral traumatic subdural hematoma 9/25-9/28, but neurosurgery consulted and decided that he is too high risk for surgery due to liver failure with thrombocytopenia,, clinically improved discharged on Decadron and prophylactic Keppra, history of dementia, GERD, hypertension, liver cirrhosis, who presented 10/2/2022 with worsening of mental status since 1 AM yesterday.  According to her daughter, he was sleeping all the day, refusing food and has been becoming more and more unresponsive .  Patient's daughter spoke to home health nurse and was told that patient is \"in transition to die\" and recommended to bring patient to ER.  In ER he was evaluated with repeat CT head without contrast that showed bilateral subdural hematomas that appear to be stable comparing to prior CT.  UA came back positive for many bacteria, WBC 10-20, leukocyte esterase large, patient started on ceftriaxone.  I discussed goals of care with the patient's daughter at bedside.  We agreed that patient would not want tube feeding or CPR, but they would like to see if patient would wake up with treatment for UTI and IV fluid resuscitation.  Additionally we discussed diagnostic work-up for his encephalopathy, including ammonia and possibly MRI of the brain.  Serum ammonia came back elevated to 236.  Ordered NG tube to start lactulose and rifaximin.    I discussed the plan of care with family.    Review of Systems  Review of Systems   Unable to perform ROS: Mental status change     Past Medical History   has a past medical history " of Arthritis, CATARACT, Dementia (HCC), Depression (08/03/2012), DJD (degenerative joint disease) (04/22/2010), GERD (gastroesophageal reflux disease) (08/03/2012), H. pylori infection (08/03/2012), Heart attack (HCC) (01/01/2001), Hypertension, Impaired fasting glucose (08/03/2012), and SunDown syndrome.    Surgical History   has a past surgical history that includes cataract extraction with iol.     Family History  family history includes Diabetes in his mother; Hyperlipidemia in his mother and another family member.   Family history reviewed with patient. There is no family history that is pertinent to the chief complaint.     Social History   reports that he quit smoking about 18 years ago. His smoking use included cigarettes. He has a 25.00 pack-year smoking history. He has never used smokeless tobacco. He reports that he does not currently use alcohol. He reports that he does not use drugs.    Allergies  Allergies   Allergen Reactions    Pcn [Penicillins] Rash and Nausea     .> 70 years ago       Medications  Prior to Admission Medications   Prescriptions Last Dose Informant Patient Reported? Taking?   acetaminophen (TYLENOL) 325 MG Tab UNK at [facility] No No   Sig: Take 1 Tablet by mouth every four hours as needed for Mild Pain or Moderate Pain.   amLODIPine (NORVASC) 5 MG Tab UNK at [facility] No No   Sig: Take 1 Tablet by mouth every day.   dexamethasone (DECADRON) 2 MG tablet UNK at [facility] No No   Sig: Take 1 Tablet by mouth every 8 hours for 9 days.   levETIRAcetam (KEPPRA) 500 MG Tab UNK at [facility] No No   Sig: Take 1 Tablet by mouth every 12 hours for 5 days.   omeprazole (PRILOSEC) 20 MG delayed-release capsule UNK at [facility] No No   Sig: Take 1 Capsule by mouth every day for 14 days.      Facility-Administered Medications: None       Physical Exam  Temp:  [37 °C (98.6 °F)] 37 °C (98.6 °F)  Pulse:  [62]  62  Resp:  [12] 12  BP: (147)/(68) 147/68  SpO2:  [92 %] 92 %  Blood Pressure : (!) 147/68   Temperature: 37 °C (98.6 °F)   Pulse: 62   Respiration: 12   Pulse Oximetry: 92 %       Physical Exam  Vitals and nursing note reviewed.   Constitutional:       General: He is not in acute distress.     Appearance: He is ill-appearing.   HENT:      Head: Normocephalic and atraumatic.      Nose: Nose normal.      Mouth/Throat:      Mouth: Mucous membranes are moist.   Eyes:      Extraocular Movements: Extraocular movements intact.      Pupils: Pupils are equal, round, and reactive to light.   Cardiovascular:      Rate and Rhythm: Normal rate and regular rhythm.   Pulmonary:      Effort: Pulmonary effort is normal.      Breath sounds: Normal breath sounds.   Abdominal:      General: Abdomen is flat. There is no distension.      Tenderness: There is no abdominal tenderness.   Musculoskeletal:         General: No swelling or deformity. Normal range of motion.      Cervical back: Neck supple.   Skin:     General: Skin is warm and dry.      Coloration: Skin is jaundiced.   Neurological:      Mental Status: He is lethargic.      GCS: GCS eye subscore is 1. GCS verbal subscore is 1. GCS motor subscore is 2.       Laboratory:  Recent Labs     10/02/22  2120   WBC 5.3   RBC 3.36*   HEMOGLOBIN 11.9*   HEMATOCRIT 33.9*   .9*   MCH 35.4*   MCHC 35.1   RDW 60.2*   PLATELETCT 67*   MPV 11.7     Recent Labs     10/02/22  2120   SODIUM 137   POTASSIUM 4.7   CHLORIDE 107   CO2 22   GLUCOSE 142*   BUN 24*   CREATININE 0.62   CALCIUM 8.3*     Recent Labs     10/02/22  2120   ALTSGPT 24   ASTSGOT 29   ALKPHOSPHAT 131*   TBILIRUBIN 2.5*   LIPASE 59   GLUCOSE 142*         No results for input(s): NTPROBNP in the last 72 hours.      Recent Labs     10/02/22  2120   TROPONINT 13       Imaging:  CT-HEAD W/O   Final Result         1.  Right holohemispheric subdural hematoma, appears similar to prior study given differences of technique.   2.   Left acute on chronic appearing left subdural hematoma, appears similar to prior study given differences of technique.   3.  Atherosclerosis.         DX-CHEST-PORTABLE (1 VIEW)   Final Result         1.  Interstitial edema and/or infiltrates.   2.  Trace bilateral pleural effusions   3.  Cardiomegaly   4.  Atherosclerosis      MR-BRAIN-W/O    (Results Pending)       X-Ray:  I have personally reviewed the images and compared with prior images.    Assessment/Plan:  Justification for Admission Status  I anticipate this patient will require at least two midnights for appropriate medical management, necessitating inpatient admission because hepatic encephalopathy    Patient will need a Med/Surg bed on NEUROLOGY service .  The need is secondary to hepatic encephalopathy.    Hepatic coma (HCC)  Assessment & Plan  History of liver cirrhosis.  Presented with progressive obtundation in the last 24 hours.  Ammonia level 236.  GCS 4.  CT head as noted above with stable bihemispheric subdural hemorrhages.  Vital stable, protecting airways  Plan: Place NG tube, start lactulose, rifaximin  IV ceftriaxone for possible triggering infection  Fall, aspiration precautions    Failure to thrive in adult  Assessment & Plan  Palliative care consult    UTI (urinary tract infection)  Assessment & Plan  IV ceftriaxone  Obtain urine culture    Subdural hematoma- (present on admission)  Assessment & Plan  Appears stable on CT  Continue dexamethasone, Keppra  Follow-up with neurosurgery as outpatient    Thrombocytopenia (HCC)- (present on admission)  Assessment & Plan  Noted, secondary to liver cirrhosis      VTE prophylaxis: SCDs/TEDs

## 2022-10-03 NOTE — PROGRESS NOTES
Hospital Medicine Daily Progress Note    Date of Service  10/3/2022    Chief Complaint  Jesse Conway is a 87 y.o. male admitted 10/2/2022 with altered mental status    Hospital Course  Mr. Molina is an 87-year-old gentleman who was brought in at the request of family by ambulance because of altered mental status.  I had a opportunity to discuss with the daughter at bedside today his current situation and condition.  The patient apparently has not been arousable for probably about 48 hours.  He has been slumped over in the chair pretty unresponsive.  Upon coming into the hospital the patient continues to be very encephalopathic with a GCS of just 3.  His ammonia level is up and he has a holohemispheric subdural hematoma.  EEG was performed today which shows severe metabolic encephalopathy without any seizure activity.  Palliative care has been consulted.  Patient is DO NOT RESUSCITATE DO NOT INTUBATE.  Patient has underlying chronic alcoholic cirrhosis of the liver.  With ammonia levels being up we are going to try to give him lactulose to decrease the ammonia levels although it is doubtful that the patient's recovery will be significant.  I have discussed this with the daughter in detail.    Interval Problem Update  Bedside discussion with the daughter health regarding patient's very poor current overall status  Most likely patient will not be able to recover from this insult  Subdural hematomas holohemispheric and continues to be significant.  Ammonia level significantly elevated  Patient is GCS is only 3  Feeding tube in place  Most likely hospice candidate    I have discussed this patient's plan of care and discharge plan at IDT rounds today with Case Management, Nursing, Nursing leadership, and other members of the IDT team.    Consultants/Specialty  None    Code Status  DNAR/DNI    Disposition  Patient is not medically cleared for discharge.   Anticipate discharge to to hospice.  I have placed the  appropriate orders for post-discharge needs.    Review of Systems  Review of Systems   Unable to perform ROS: Acuity of condition      Physical Exam  Temp:  [37 °C (98.6 °F)-37.1 °C (98.7 °F)] 37.1 °C (98.7 °F)  Pulse:  [48-66] 61  Resp:  [8-15] 13  BP: (123-157)/(55-68) 157/65  SpO2:  [92 %-96 %] 93 %    Physical Exam  Vitals and nursing note reviewed. Exam conducted with a chaperone present.   Constitutional:       General: He is not in acute distress.     Appearance: He is underweight. He is toxic-appearing.      Interventions: Nasal cannula in place.      Comments: Feeding tube in place   HENT:      Head: Normocephalic.      Right Ear: Tympanic membrane and external ear normal.      Left Ear: Tympanic membrane and external ear normal.      Nose: Nose normal. No rhinorrhea.      Mouth/Throat:      Mouth: Mucous membranes are moist.      Pharynx: Oropharynx is clear.   Eyes:      Extraocular Movements: Extraocular movements intact.      Conjunctiva/sclera: Conjunctivae normal.      Pupils: Pupils are equal, round, and reactive to light.   Cardiovascular:      Rate and Rhythm: Normal rate and regular rhythm.      Heart sounds: No murmur heard.  Pulmonary:      Effort: Pulmonary effort is normal.      Breath sounds: Normal breath sounds.   Abdominal:      General: Abdomen is flat. Bowel sounds are normal.      Palpations: Abdomen is soft.      Tenderness: There is no guarding.      Hernia: No hernia is present.   Musculoskeletal:      Cervical back: Normal range of motion and neck supple. No rigidity.      Right lower leg: No edema.      Left lower leg: No edema.   Lymphadenopathy:      Cervical: No cervical adenopathy.   Skin:     General: Skin is warm and dry.      Capillary Refill: Capillary refill takes less than 2 seconds.   Neurological:      Mental Status: He is unresponsive.      GCS: GCS eye subscore is 1. GCS verbal subscore is 1. GCS motor subscore is 1.      Comments: Patient does not withdraw to pain    Psychiatric:         Speech: He is noncommunicative.         Behavior: Behavior is uncooperative.         Cognition and Memory: Cognition is impaired. Memory is impaired.       Fluids    Intake/Output Summary (Last 24 hours) at 10/3/2022 1335  Last data filed at 10/3/2022 0238  Gross per 24 hour   Intake 100 ml   Output --   Net 100 ml       Laboratory  Recent Labs     10/02/22  2120   WBC 5.3   RBC 3.36*   HEMOGLOBIN 11.9*   HEMATOCRIT 33.9*   .9*   MCH 35.4*   MCHC 35.1   RDW 60.2*   PLATELETCT 67*   MPV 11.7     Recent Labs     10/02/22  2120   SODIUM 137   POTASSIUM 4.7   CHLORIDE 107   CO2 22   GLUCOSE 142*   BUN 24*   CREATININE 0.62   CALCIUM 8.3*                   Imaging  DX-ABDOMEN FOR TUBE PLACEMENT   Final Result         1.  Nonspecific bowel gas pattern.   2.  Dobbhoff tube tip terminates overlying the expected location of the gastric body.   3.  Interstitial pulmonary edema or infiltrates.   4.  Atherosclerosis      CT-HEAD W/O   Final Result         1.  Right holohemispheric subdural hematoma, appears similar to prior study given differences of technique.   2.  Left acute on chronic appearing left subdural hematoma, appears similar to prior study given differences of technique.   3.  Atherosclerosis.         DX-CHEST-PORTABLE (1 VIEW)   Final Result         1.  Interstitial edema and/or infiltrates.   2.  Trace bilateral pleural effusions   3.  Cardiomegaly   4.  Atherosclerosis           Assessment/Plan  * AMS (altered mental status)- (present on admission)  Assessment & Plan  GCS is 3  Combination of subdural hematoma and hyperammonemia     Hepatic coma (HCC)  Assessment & Plan  Patient has longstanding history of excessive alcohol abuse  Patient has chronic established cirrhosis that at this point is not reversible.  Minimal ascites.  Ammonia levels at this point are up to 236.  Oral lactulose per NG tube is being given lactulose per enema is also being given  Monitor mental status so far  GCS is 3    Traumatic Subdural hematoma- (present on admission)  Assessment & Plan  Patient presents with holohemispheric subdural hematoma  Patient at this point is obtunded and has not been responding at home.  Patient will not respond to even painful stimuli  Seizure prevention with Keppra continue with  Decadron continued for decreasing cerebral edema  Daughter and I had extensive discussion at bedside, she understood from previous hospitalization the patient would be able to just return to normal activity as before.  I explained to her that with the ongoing condition it is doubtful that the patient will resume normal activity as previous.  Patient's subdural hematoma has not worsened however his mental status and neurological status has definitely deteriorated.    UTI (urinary tract infection)  Assessment & Plan  Pending blood and urine culture  IV Rocephin continue day #2    Failure to thrive in adult  Assessment & Plan  Await palliative care consult  Doubtful that this patient will make a successful recovery given his multiple insults including cirrhosis and subdural hematoma    Thrombocytopenia (HCC)- (present on admission)  Assessment & Plan  With chronic cirrhosis of the liver patient has thrombocytopenia  Currently no bleeding noted  Does have some petechia and bruising         VTE prophylaxis: SCDs/TEDs    I have performed a physical exam and reviewed and updated ROS and Plan today (10/3/2022). In review of yesterday's note (10/2/2022), there are no changes except as documented above.

## 2022-10-03 NOTE — ED TRIAGE NOTES
"Chief Complaint   Patient presents with    ALOC     Patient found unresponsive by family, Last known well @ 1am yesterday.     BIBA for above. Patient GCS is 3, blood collected and sent, EKG performed, Vitals as bellow. Patient presented in a diaper that was saturated with very malodorous urine. Medical history currently unknown by family per EMS.    BP (!) 147/68   Pulse 62   Temp 37 °C (98.6 °F) (Temporal)   Resp 12   Ht 1.753 m (5' 9\")   Wt 59.9 kg (132 lb)   SpO2 92%   BMI 19.49 kg/m²    "

## 2022-10-03 NOTE — ED PROVIDER NOTES
ED Provider Note    CHIEF COMPLAINT  Chief Complaint   Patient presents with    ALOC     Patient found unresponsive by family, Last known well @ 1am yesterday.        HPI    Primary care provider: CRUZ Lopez   History obtained from: EMS  History limited by: Patient's clinical condition    Jesse Conway is a 87 y.o. male who presents to the ED by EMS after being found unresponsive by family shortly prior to arrival.  Limited history/review of system/physical exam due to patient's altered mental status.  Per EMS, last known well time was 1 AM yesterday morning.  No reported injury or trauma and no further history was given by EMS.    REVIEW OF SYSTEMS  Please see HPI for pertinent positives/negatives.  Limited by patient's clinical condition.    PAST MEDICAL HISTORY  Past Medical History:   Diagnosis Date    GERD (gastroesophageal reflux disease) 2012    H. pylori infection 2012    Depression 2012    Impaired fasting glucose 2012    DJD (degenerative joint disease) 2010    Heart attack (HCC) 2001    Arthritis     CATARACT     Dementia (HCC)     Hypertension     SunDown syndrome         SURGICAL HISTORY  Past Surgical History:   Procedure Laterality Date    CATARACT EXTRACTION WITH IOL          SOCIAL HISTORY  Social History     Tobacco Use    Smoking status: Former     Packs/day: 0.50     Years: 50.00     Pack years: 25.00     Types: Cigarettes     Quit date: 2004     Years since quittin.7    Smokeless tobacco: Never   Vaping Use    Vaping Use: Never used   Substance and Sexual Activity    Alcohol use: Not Currently     Comment: occasionally.     Drug use: No    Sexual activity: Yes     Partners: Female     Comment:         FAMILY HISTORY  Family History   Problem Relation Age of Onset    Diabetes Mother     Hyperlipidemia Mother     Hyperlipidemia Other         hyperlipidemia    Stroke Neg Hx     Heart Disease Neg Hx         CURRENT  "MEDICATIONS  Home Medications       Reviewed by Zonia Solano (Pharmacy Tech) on 10/02/22 at 2327  Med List Status: Complete     Medication Last Dose Status   acetaminophen (TYLENOL) 325 MG Tab UNK Active   amLODIPine (NORVASC) 5 MG Tab UNK Active   dexamethasone (DECADRON) 2 MG tablet UNK Active   levETIRAcetam (KEPPRA) 500 MG Tab UNK Active   omeprazole (PRILOSEC) 20 MG delayed-release capsule UNK Active                     ALLERGIES  Allergies   Allergen Reactions    Pcn [Penicillins] Rash and Nausea     .> 70 years ago        PHYSICAL EXAM  VITAL SIGNS: /62   Pulse (!) 57   Temp 37 °C (98.6 °F) (Temporal)   Resp (!) 11   Ht 1.753 m (5' 9\")   Wt 59.9 kg (132 lb)   SpO2 94%   BMI 19.49 kg/m²  @MARJORIE[[id number]::@     Pulse ox interpretation: 92% I interpret this pulse ox as normal     Cardiac monitor interpretation: Sinus rhythm with heart rate in the 50s to 60s as interpreted by me.  The patient presented with altered mental status and cardiac monitor was ordered to monitor for dysrhythmia.    Constitutional: Well developed, well nourished, somnolent in no apparent distress  HENT: No external signs of trauma, normocephalic, oropharynx dry and clear,, positive gag reflex and patient able to maintain airway, nose normal with nasal trumpet in left nostril  Eyes: PERRL, 2 mm bilaterally, conjunctiva without erythema, no discharge, no icterus    Neck: Soft and supple, trachea midline, no stridor, no apparent tenderness or stiffness, no LAD, no JVD  Cardiovascular: Regular rate and rhythm, no murmurs/rubs/gallops, good distal pulses and perfusion    Thorax & Lungs: No respiratory distress, CTAB   Abdomen: Soft, nontender, nondistended, no guarding, no rebound, normal BS    Back: Normal inspection without apparent tenderness to palpation  Extremities: No cyanosis, no edema, no gross deformity, good passive ROM, no apparent tenderness, intact distal pulses with brisk cap refill    Skin: Warm, dry, no " pallor/cyanosis, no rash noted    Lymphatic: No lymphadenopathy noted    Neuro: GCS E1V1M4, noted to move all 4 extremities spontaneously at times, no apparent seizure-like activity  Psychiatric: Unable to assess      DIAGNOSTIC STUDIES / PROCEDURES    EKG  12 Lead EKG obtained at 2129 and interpreted by me:   Rate: 59   Rhythm: Sinus bradycardia  Ectopy: None  Intervals: Borderline first-degree block  Axis: Normal   QRS: RBBB  ST segments: Normal    Clinical Impression: Sinus bradycardia with borderline first-degree block and RBBB and associated repolarization abnormalities  Compared to September 25, 2022 without significant change      LABS  All labs reviewed by me.     Results for orders placed or performed during the hospital encounter of 10/02/22   CBC WITH DIFFERENTIAL   Result Value Ref Range    WBC 5.3 4.8 - 10.8 K/uL    RBC 3.36 (L) 4.70 - 6.10 M/uL    Hemoglobin 11.9 (L) 14.0 - 18.0 g/dL    Hematocrit 33.9 (L) 42.0 - 52.0 %    .9 (H) 81.4 - 97.8 fL    MCH 35.4 (H) 27.0 - 33.0 pg    MCHC 35.1 33.7 - 35.3 g/dL    RDW 60.2 (H) 35.9 - 50.0 fL    Platelet Count 67 (L) 164 - 446 K/uL    MPV 11.7 9.0 - 12.9 fL    Neutrophils-Polys 73.50 (H) 44.00 - 72.00 %    Lymphocytes 15.20 (L) 22.00 - 41.00 %    Monocytes 10.20 0.00 - 13.40 %    Eosinophils 0.00 0.00 - 6.90 %    Basophils 0.20 0.00 - 1.80 %    Immature Granulocytes 0.90 0.00 - 0.90 %    Nucleated RBC 0.00 /100 WBC    Neutrophils (Absolute) 3.87 1.82 - 7.42 K/uL    Lymphs (Absolute) 0.80 (L) 1.00 - 4.80 K/uL    Monos (Absolute) 0.54 0.00 - 0.85 K/uL    Eos (Absolute) 0.00 0.00 - 0.51 K/uL    Baso (Absolute) 0.01 0.00 - 0.12 K/uL    Immature Granulocytes (abs) 0.05 0.00 - 0.11 K/uL    NRBC (Absolute) 0.00 K/uL   COMP METABOLIC PANEL   Result Value Ref Range    Sodium 137 135 - 145 mmol/L    Potassium 4.7 3.6 - 5.5 mmol/L    Chloride 107 96 - 112 mmol/L    Co2 22 20 - 33 mmol/L    Anion Gap 8.0 7.0 - 16.0    Glucose 142 (H) 65 - 99 mg/dL    Bun 24 (H) 8 -  22 mg/dL    Creatinine 0.62 0.50 - 1.40 mg/dL    Calcium 8.3 (L) 8.5 - 10.5 mg/dL    AST(SGOT) 29 12 - 45 U/L    ALT(SGPT) 24 2 - 50 U/L    Alkaline Phosphatase 131 (H) 30 - 99 U/L    Total Bilirubin 2.5 (H) 0.1 - 1.5 mg/dL    Albumin 2.1 (L) 3.2 - 4.9 g/dL    Total Protein 4.8 (L) 6.0 - 8.2 g/dL    Globulin 2.7 1.9 - 3.5 g/dL    A-G Ratio 0.8 g/dL   TROPONIN   Result Value Ref Range    Troponin T 13 6 - 19 ng/L   LIPASE   Result Value Ref Range    Lipase 59 11 - 82 U/L   LACTIC ACID   Result Value Ref Range    Lactic Acid 2.6 (H) 0.5 - 2.0 mmol/L   URINALYSIS (UA)    Specimen: Urine, Cath   Result Value Ref Range    Color Yellow     Character Turbid (A)     Specific Gravity 1.018 <1.035    Ph 8.5 (A) 5.0 - 8.0    Glucose Negative Negative mg/dL    Ketones Negative Negative mg/dL    Protein 100 (A) Negative mg/dL    Bilirubin Negative Negative    Urobilinogen, Urine 2.0 Negative    Nitrite Negative Negative    Leukocyte Esterase Large (A) Negative    Occult Blood Moderate (A) Negative    Micro Urine Req Microscopic    URINE DRUG SCREEN   Result Value Ref Range    Amphetamines Urine Negative Negative    Barbiturates Negative Negative    Benzodiazepines Negative Negative    Cocaine Metabolite Negative Negative    Methadone Negative Negative    Opiates Negative Negative    Oxycodone Negative Negative    Phencyclidine -Pcp Negative Negative    Propoxyphene Negative Negative    Cannabinoid Metab Negative Negative   IONIZED CALCIUM   Result Value Ref Range    Ionized Calcium 1.2 1.1 - 1.3 mmol/L   AMMONIA   Result Value Ref Range    Ammonia 236 (HH) 11 - 45 umol/L   PROCALCITONIN   Result Value Ref Range    Procalcitonin 0.05 <0.25 ng/mL   TSH WITH REFLEX TO FT4   Result Value Ref Range    TSH 4.210 0.380 - 5.330 uIU/mL   DIAGNOSTIC ALCOHOL   Result Value Ref Range    Diagnostic Alcohol <10.1 <10.1 mg/dL   MAGNESIUM   Result Value Ref Range    Magnesium 1.9 1.5 - 2.5 mg/dL   PHOSPHORUS   Result Value Ref Range     Phosphorus 2.9 2.5 - 4.5 mg/dL   URINE MICROSCOPIC (W/UA)   Result Value Ref Range    WBC 10-20 (A) /hpf    RBC 2-5 (A) /hpf    Bacteria Many (A) None /hpf    Epithelial Cells Negative /hpf    Triple Phos Crystal Moderate /hpf   ESTIMATED GFR   Result Value Ref Range    GFR (CKD-EPI) 94 >60 mL/min/1.73 m 2   VENOUS BLOOD GAS   Result Value Ref Range    Venous Bg Ph 7.46 (H) 7.31 - 7.45    Venous Bg Pco2 31.8 (L) 41.0 - 51.0 mmHg    Venous Bg Po2 65.5 (H) 25.0 - 40.0 mmHg    Venous Bg O2 Saturation 92.7 %    Venous Bg Hco3 22 (L) 24 - 28 mmol/L    Venous Bg Base Excess -1 mmol/L    Body Temp 37.0 Centigrade   ACETAMINOPHEN   Result Value Ref Range    Acetaminophen -Tylenol <5.0 (L) 10.0 - 30.0 ug/mL   Salicylate   Result Value Ref Range    Salicylates, Quant. <1.0 (L) 15.0 - 25.0 mg/dL   EKG   Result Value Ref Range    Report       [facility] Emergency Dept.    Test Date:  2022-10-02  Pt Name:    ADÁN LOFTON        Department: ER  MRN:        [id number]                      Room:        08  Gender:     Male                         Technician: [id number]  :        [id number]                   Requested By:MIK CANTOR  Order #:    [id number]                    Reading MD:    Measurements  Intervals                                Axis  Rate:       58                           P:          17  OK:         202                          QRS:        28  QRSD:       138                          T:          16  QT:         477  QTc:        469    Interpretive Statements  Sinus bradycardia  Right bundle branch block  Baseline wander in lead(s) V3,V4,V5  Compared to ECG 2022 17:34:49  Sinus rhythm no longer present  Ventricular premature complex(es) no longer present  Short OK interval no longer present          RADIOLOGY  The radiologist's interpretation of all radiological studies have been reviewed by me.     CT-HEAD W/O   Final Result         1.  Right holohemispheric subdural hematoma, appears  similar to prior study given differences of technique.   2.  Left acute on chronic appearing left subdural hematoma, appears similar to prior study given differences of technique.   3.  Atherosclerosis.         DX-CHEST-PORTABLE (1 VIEW)   Final Result         1.  Interstitial edema and/or infiltrates.   2.  Trace bilateral pleural effusions   3.  Cardiomegaly   4.  Atherosclerosis      DX-ABDOMEN FOR TUBE PLACEMENT    (Results Pending)          COURSE & MEDICAL DECISION MAKING  Nursing notes, VS, PMSFHx reviewed in chart.     Review of past medical records shows the patient was last admitted to this hospital September 25, 2022 for altered mental status and CT head demonstrated subdural hematoma and was discharged on September 28, 2022.      Differential diagnoses considered include but are not limited to: CVA/TIA/intracranial hemorrhage, syncope, meningitis/encephalitis, sepsis, hepatic encephalopathy, Sz, UTI, hypoglycemia, hypoxia, hypercarbia, electrolyte abnormality, endocrine dysfunction, thyroid disorder, dementia      2250: Discussed findings with daughter.  She reports patient has been essentially unresponsive since 2 AM this morning.  No injury/trauma/fever/shortness of breath or difficulty breathing/vomiting.  She does not want life support measures.      2325: D/W hospitalist who will admit patient      History and physical exam as above.  I was called to the room by ED nurse due to his presenting clinical condition.  Limited initial history/review of systems/physical exam due to patient's decreased mental status.  Daughter arrived later and was able to provide more history as above.  Initial work-up was initiated including EKG which did not show significant change compared to prior.  UA with findings suggesting UTI and Rocephin was initiated.  Other lab abnormalities appear stable compared to his recent results.  Imaging studies with findings as above.  I discussed the findings with the daughter.  She  does not want life support measures given patient's age, history of dementia and clinical findings but would like admission and to make sure that patient does not suffer.  I discussed with the hospitalist who graciously agreed to admit patient for further care.      CRITICAL CARE NOTE:  Total critical care time spent on this patient was 30 minutes exclusive of separately billable procedures.  This may include direct bedside patient care, speaking with family members, review of past medical records, reviewing the results of laboratory/diagnostic studies, consulting with other physicians, as well as evaluating the response to the therapy instituted.        FINAL IMPRESSION  1. Altered mental status, unspecified altered mental status type Acute   2. Subdural hematoma Active   3. Urinary tract infection with hematuria, site unspecified Active          DISPOSITION  Patient will be admitted by hospitalist in guarded condition      Electronically signed by: Fer Gomez D.O., 10/2/2022 9:24 PM      Portions of this record were made with voice recognition software.  Despite my review, spelling/grammar/context errors may still remain.  Interpretation of this chart should be taken in this context.

## 2022-10-03 NOTE — ED NOTES
Rec'd report from Mohinder RN, assumed care for 30 min lunch break, care will return to primary RN at end of 30 minutes.  Pt is unresponsive on stretcher with daughter at b/s, on monitor, VSS, respirations even and unlabored.

## 2022-10-03 NOTE — ASSESSMENT & PLAN NOTE
Patient has longstanding history of excessive alcohol abuse  Patient has chronic established cirrhosis that at this point is not reversible.  Minimal ascites.  Ammonia levels at this point are up to 236.  Oral lactulose per NG tube is being given lactulose per enema is also being given  Monitor mental status so far GCS is 3

## 2022-10-03 NOTE — DISCHARGE PLANNING
Note:  Received call from Tamra from [facility] saying that pt was on service with them for home health.

## 2022-10-03 NOTE — DISCHARGE PLANNING
Medical Social Work    Referral: Acute Medical Patient    Intervention: Pt is an 87 year old male brought in by medics for ALOC found by family.  Pt is Jesse Conway (: 1935).  Per medics multiple family members were at the home and will be coming into the hospital.  Per chart pt's emergency contact is his daughter, Kellen ([phone]).      Plan: SW will follow as needed.

## 2022-10-03 NOTE — ED NOTES
NG tube placed. Verbal order for CXR to confirm placement put in per Dr. Pereyra. Patient NPO until after XR.

## 2022-10-04 NOTE — CARE PLAN
The patient is Watcher - Medium risk of patient condition declining or worsening    Shift Goals  Clinical Goals: Monitor labs, comfort  Patient Goals: AASHISH  Family Goals: Comfort, ammonia levels    Progress made toward(s) clinical / shift goals:  Patient unresponsive to verbal stimuli. Q2 hour turns are in place to maintain skin integrity. Bowel management system in place for lactulose enemas. Education provided to family on Bowel management system, labs and Q2 hour turns. Family shows understanding of education provided. Will continue to monitor and perform Q2 hour turns. Bed low, locked, family at bedside.    Problem: Skin Integrity  Goal: Skin integrity is maintained or improved  Outcome: Progressing   Q2 hour turns in place. BMS in place    Patient is not progressing towards the following goals: N/A

## 2022-10-04 NOTE — HOSPICE
Spoke to family explained hospice services as comfort focus care vs curative treatment. Explained hospice philosophy, Staff Roles, Medication and DME.     Decsion maker Kellen was on the phone during the conversation. Plan to meet with Kellen in the morning again.     Family is not ready for comfort care yet.     Patient could benefit from something for anxiety. Dr. Palacios voalted.     Primary RN updated.

## 2022-10-04 NOTE — DIETARY
"Nutrition services: Day 1 of admit.  Jesse Conway is a 87 y.o. male with admitting DX of AMS.    Consult received for Failure to Thrive in adult on problem list.    Assessment:  Height: 175.3 cm (5' 9\")  Weight: 59.9 kg (132 lb) - estimated  Body mass index is 19.49 kg/m²., BMI classification: normal  Diet/Intake: NPO    Evaluation:   Admit for AMS with traumatic SDH. Noted poor prognosis. And GOC discussion ongoing. Palliative care pending c/s. Md notes feeding tube in place and likely hospice candidate.  Pt is nonverbal and noted bedridden since June 2022.   Anticipate muscle/fat wasting but unknown at this time. Discussed appearance with RN however pt is swollen and unable to accurately interpret at this time.  Skin: No wounds. Mild to severe edema noted in flowsheets. Wound team placed BMS.  Labs: glucose 206, BUN 33, Alk phos 136, total bili 3.6, Ammonia 51  Meds: decadron, pepcid, lactulose, keppra, colace, bowel protocol, NS infusion  Last BM: 10/3    Malnutrition Risk: N/A    Recommendations/Plan:   Await [facility] outcomes.  Diet per MD.     RD following.        "

## 2022-10-04 NOTE — WOUND TEAM
Wound team note:   Pt seen for placement of BMS. MD order verified. Pt assessed, noted to be incontinent of loose brown stool. Sacrum/buttocks pink and intact. Sphincter tone determined to be adequate. BMS placed without difficulty, 45 ml's placed in retention balloon, irrigated with 300 ml warm tap water. Nsg to irrigate q shift with 300-500ml warm tap water.

## 2022-10-04 NOTE — DISCHARGE PLANNING
Care Transition Team Assessment    Met with daughterKellen, at bedside. Pt unable to speak.      Pt lives in a one story home with wife, daughter, Kellen, her  and their children. No steps into home.      His wife doesn't speak English.       Pt is bedridden since June 2022.  Pt uses oxygen at home 2L/NC and he received it from [employer].  Son bought a bed that raises up and down.     Pt is a DNR/DNI.    Information Source  Orientation Level: Unable to assess  Information Given By: Relative (Kellen ortez @ [phone])  Who is responsible for making decisions for patient? : Adult child  Name(s) of Primary Decision Maker: Kellen    Readmission Evaluation  Is this a readmission?: Yes - unplanned readmission    Elopement Risk  Legal Hold: No  Ambulatory or Self Mobile in Wheelchair: No-Not an Elopement Risk  Disoriented: Person-At Risk for Elopement, Place-At Risk for Elopement, Time-At Risk for Elopement, Situation-At Risk for Elopement  Psychiatric Symptoms: None  History of Wandering: No  Elopement this Admit: No  Vocalizing Wanting to Leave: No  Displays Behaviors, Body Language Wanting to Leave: No-Not at Risk for Elopement  Elopement Risk: Not at Risk for Elopement    Discharge Preparedness  What is your plan after discharge?: Uncertain - pending medical team collaboration  What are your discharge supports?: Spouse, Child ( to Kellen and grandchildren)  Prior Functional Level: Total Assist    Functional Assesment  Prior Functional Level: Total Assist  Advance Directive  Advance Directive?: None  Advance Directive offered?: AD Booklet given    Domestic Abuse  Have you ever been the victim of abuse or violence?: Refused (unresponsive)    Psychological Assessment  History of Substance Abuse: Alcohol    [facility] with [facility].     No other needs at this time.

## 2022-10-04 NOTE — DOCUMENTATION QUERY
[facility]                                                                       Query Response Note      PATIENT:               ADÁN SEGAL  ACCT #:                  [id number]  MRN:                     [id number]  :                      1935  ADMIT DATE:       10/2/2022 9:16 PM  DISCH DATE:          RESPONDING  PROVIDER #:        [id number]           QUERY TEXT:    Hepatic encephalopathy is documented in the Medical Record. Please specify the acuity of this condition.    NOTE:  If the appropriate response is not listed below, please respond with a new note.    Thank you.    The patient's Clinical Indicators include:  10/3 H&P: Hepatic encephalopathy. Ammonia level 236, GCS 4.  10/3 ED: The patient presented with altered mental status.    Risk factor: Liver cirrhosis    Treatment: Lactulose, rifaximin    Thank you,  Bubba Cuevas  Clinical   Connect via [facility]  Options provided:   -- Acute hepatic encephalopathy   -- Acute and chronic hepatic encephalopathy   -- Chronic hepatic encephalopathy   -- Other explanation, Please specify   -- Unable to Determine      Query created by: Bubba Cuevas on 10/3/2022 11:29 AM    RESPONSE TEXT:    Acute hepatic encephalopathy          Electronically signed by:  LEO GIBBONS MD 10/3/2022 10:04 PM

## 2022-10-05 NOTE — HOSPICE
Met with daughter Kellen, had a long conversation about hospice. Hospice philosophy, staff roles, dme and medications.   Comfort focus care vs curative treatment.     Kellen is not comfortable going home on hospice at this time.     She asked for home health right now if possible to finish treatments and to see if he can get back to baseline.      Sent voalte to FIGUEROA M[facility]quez       Patient has been approved for [facility] hosp[facility] in the community by Samia MERCADO if family decides to go with hospice after all.

## 2022-10-05 NOTE — PROGRESS NOTES
"Hospital Medicine Daily Progress Note    Date of Service  10/4/2022    Chief Complaint  Jesse Conway is a 87 y.o. male admitted 10/2/2022 with altered mental status    Hospital Course  Mr. Molina is an 87-year-old gentleman who was brought in at the request of family by ambulance because of altered mental status.  I had a opportunity to discuss with the daughter at bedside today his current situation and condition.  The patient apparently has not been arousable for probably about 48 hours.  He has been slumped over in the chair pretty unresponsive.  Upon coming into the hospital the patient continues to be very encephalopathic with a GCS of just 3.  His ammonia level is up and he has a holohemispheric subdural hematoma.  EEG was performed today which shows severe metabolic encephalopathy without any seizure activity.  Palliative care has been consulted.  Patient is DO NOT RESUSCITATE DO NOT INTUBATE.  Patient has underlying chronic alcoholic cirrhosis of the liver.  With ammonia levels being up we are going to try to give him lactulose to decrease the ammonia levels although it is doubtful that the patient's recovery will be significant.  I have discussed this with the daughter in detail.    Interval Problem Update  Bedside discussion with the daughter health regarding patient's very poor current overall status  Most likely patient will not be able to recover from this insult  Subdural hematomas holohemispheric and continues to be significant.  Ammonia level significantly elevated  Patient is GCS is only 3  Feeding tube in place  Most likely hospice candidate\"    10/4. Discussed with family  Plan for hospice at discharge. Not ready for comfort care yet.    I have discussed this patient's plan of care and discharge plan at IDT rounds today with Case Management, Nursing, Nursing leadership, and other members of the IDT team.    Consultants/Specialty  None    Code Status  DNAR/DNI  Hospice eval ordered and " discussed  Disposition  Patient is not medically cleared for discharge.   Anticipate discharge to to hospice.  I have placed the appropriate orders for post-discharge needs.    Review of Systems  Review of Systems   Unable to perform ROS: Acuity of condition      Physical Exam  Temp:  [36 °C (96.8 °F)-37.3 °C (99.1 °F)] 36 °C (96.8 °F)  Pulse:  [] 86  Resp:  [16-18] 18  BP: (138-168)/(69-76) 138/73  SpO2:  [92 %-97 %] 93 %    Physical Exam  Vitals and nursing note reviewed. Exam conducted with a chaperone present.   Constitutional:       General: He is not in acute distress.     Appearance: He is underweight. He is toxic-appearing.      Interventions: Nasal cannula in place.      Comments: Feeding tube in place   HENT:      Head: Normocephalic.      Right Ear: Tympanic membrane and external ear normal.      Left Ear: Tympanic membrane and external ear normal.      Nose: Nose normal. No rhinorrhea.      Mouth/Throat:      Mouth: Mucous membranes are moist.      Pharynx: Oropharynx is clear.   Eyes:      Extraocular Movements: Extraocular movements intact.      Conjunctiva/sclera: Conjunctivae normal.      Pupils: Pupils are equal, round, and reactive to light.   Cardiovascular:      Rate and Rhythm: Normal rate and regular rhythm.      Heart sounds: No murmur heard.  Pulmonary:      Effort: Pulmonary effort is normal.      Breath sounds: Normal breath sounds.   Abdominal:      General: Abdomen is flat. Bowel sounds are normal.      Palpations: Abdomen is soft.      Tenderness: There is no guarding.      Hernia: No hernia is present.   Musculoskeletal:      Cervical back: Normal range of motion and neck supple. No rigidity.      Right lower leg: No edema.      Left lower leg: No edema.   Lymphadenopathy:      Cervical: No cervical adenopathy.   Skin:     General: Skin is warm and dry.      Capillary Refill: Capillary refill takes less than 2 seconds.   Neurological:      Mental Status: He is unresponsive.       GCS: GCS eye subscore is 1. GCS verbal subscore is 1. GCS motor subscore is 1.      Comments: Patient does not withdraw to pain   Psychiatric:         Speech: He is noncommunicative.         Behavior: Behavior is uncooperative.         Cognition and Memory: Cognition is impaired. Memory is impaired.      Comments: Not agitated       Fluids    Intake/Output Summary (Last 24 hours) at 10/4/2022 1815  Last data filed at 10/4/2022 1700  Gross per 24 hour   Intake --   Output 3200 ml   Net -3200 ml         Laboratory  Recent Labs     10/02/22  2120 10/04/22  0220   WBC 5.3 12.8*   RBC 3.36* 3.65*   HEMOGLOBIN 11.9* 12.8*   HEMATOCRIT 33.9* 36.6*   .9* 100.3*   MCH 35.4* 35.1*   MCHC 35.1 35.0   RDW 60.2* 61.2*   PLATELETCT 67* 68*   MPV 11.7 12.4       Recent Labs     10/02/22  2120 10/04/22  0220   SODIUM 137 141   POTASSIUM 4.7 4.5   CHLORIDE 107 110   CO2 22 19*   GLUCOSE 142* 206*   BUN 24* 33*   CREATININE 0.62 0.73   CALCIUM 8.3* 8.7                     Imaging  DX-ABDOMEN FOR TUBE PLACEMENT   Final Result         1.  Nonspecific bowel gas pattern.   2.  Dobbhoff tube tip terminates overlying the expected location of the gastric body.   3.  Interstitial pulmonary edema or infiltrates.   4.  Atherosclerosis      CT-HEAD W/O   Final Result         1.  Right holohemispheric subdural hematoma, appears similar to prior study given differences of technique.   2.  Left acute on chronic appearing left subdural hematoma, appears similar to prior study given differences of technique.   3.  Atherosclerosis.         DX-CHEST-PORTABLE (1 VIEW)   Final Result         1.  Interstitial edema and/or infiltrates.   2.  Trace bilateral pleural effusions   3.  Cardiomegaly   4.  Atherosclerosis             Assessment/Plan  * Traumatic Subdural hematoma- (present on admission)  Assessment & Plan  Patient presents with holohemispheric subdural hematoma  Patient at this point is obtunded and has not been responding at  home.  Patient will not respond to even painful stimuli  Seizure prevention with Keppra continue with  Decadron continued for decreasing cerebral edema  Daughter and I had extensive discussion at bedside, she understood from previous hospitalization the patient would be able to just return to normal activity as before.  I explained to her that with the ongoing condition it is doubtful that the patient will resume normal activity as previous.  Patient's subdural hematoma has not worsened however his mental status and neurological status has definitely deteriorated.    Failure to thrive in adult  Assessment & Plan  Await palliative care consult  Doubtful that this patient will make a successful recovery given his multiple insults including cirrhosis and subdural hematoma    UTI (urinary tract infection)  Assessment & Plan  Pending blood and urine culture  IV Rocephin continue day #2    Hepatic coma (HCC)  Assessment & Plan  Patient has longstanding history of excessive alcohol abuse  Patient has chronic established cirrhosis that at this point is not reversible.  Minimal ascites.  Ammonia levels at this point are up to 236.  Oral lactulose per NG tube is being given lactulose per enema is also being given  Monitor mental status so far GCS is 3    AMS (altered mental status)- (present on admission)  Assessment & Plan  GCS is 3  Combination of subdural hematoma and hyperammonemia     Thrombocytopenia (HCC)- (present on admission)  Assessment & Plan  With chronic cirrhosis of the liver patient has thrombocytopenia  Currently no bleeding noted  Does have some petechia and bruising       VTE prophylaxis: SCDs/TEDs    I have performed a physical exam and reviewed and updated ROS and Plan today (10/4/2022). In review of yesterday's note (10/3/2022), there are no changes except as documented above.

## 2022-10-05 NOTE — CARE PLAN
The patient is Watcher - Medium risk of patient condition declining or worsening    Shift Goals  Clinical Goals: Monitor labs, comfort, safety  Patient Goals: AASHISH  Family Goals: Comfort    Progress made toward(s) clinical / shift goals:  Patient does not respond to assessment. Q2 hour turns in place to help prevent skin breakdown. Family at bedside. Goal is to keep patient comfortable. Will continue to reassess and monitor neuro status.    Problem: Skin Integrity  Goal: Skin integrity is maintained or improved  Outcome: Progressing   Q2 hour turns in place    Patient is not progressing towards the following goals: N/A

## 2022-10-05 NOTE — CARE PLAN
The patient is Watcher - Medium risk of patient condition declining or worsening    Shift Goals  Clinical Goals: Monitor labs, comfort  Patient Goals: AASHISH  Family Goals: Comfort, ammonia levels    Progress made toward(s) clinical / shift goals:  Hospice RN will meet with family tomorrow AM.     Patient is not progressing towards the following goals:      Problem: Knowledge Deficit - Standard  Goal: Patient and family/care givers will demonstrate understanding of plan of care, disease process/condition, diagnostic tests and medications  Outcome: Not Progressing     Problem: Pain - Standard  Goal: Alleviation of pain or a reduction in pain to the patient’s comfort goal  Outcome: Not Progressing

## 2022-10-05 NOTE — CARE PLAN
The patient is Watcher - Medium risk of patient condition declining or worsening    Shift Goals  Clinical Goals: comfort, safety  Patient Goals: josé miguel  Family Goals: Comfort    Progress made toward(s) clinical / shift goals:  pt changed and appears comfprtable    Patient is not progressing towards the following goals:

## 2022-10-05 NOTE — DISCHARGE PLANNING
Presented choices to family in regard to HH.  They will make a decision tonight and will obtain in the morn.

## 2022-10-06 NOTE — DISCHARGE SUMMARY
"Discharge Summary    CHIEF COMPLAINT ON ADMISSION  Chief Complaint   Patient presents with    ALOC     Patient found unresponsive by family, Last known well @ 1am yesterday.       Reason for Admission  EMS     Admission Date  10/2/2022    CODE STATUS  DNAR/DNI    HPI & HOSPITAL COURSE  This is a 87 y.o. male here with ALOC (Patient found unresponsive by family, Last known well @ 1am yesterday.)  Please review Dr. Kevin Pereyra M.D. notes for further details of history of present illness, past medical/social/family histories, allergies and medications.   He has a history of recent admission for bilateral traumatic subdural hematoma 9/25-9/28, but neurosurgery consulted and decided that he is too high risk for surgery due to liver failure with thrombocytopenia,, clinically improved discharged on Decadron and prophylactic Keppra, history of dementia, GERD, hypertension, liver cirrhosis, who presented 10/2/2022 with worsening of mental status since 1 AM yesterday.  Per daughter, he was sleeping all the day, refusing food and has been becoming more and more unresponsive .  Patient's daughter spoke to home health nurse and was told that patient is \"in transition to die\" and recommended to bring patient to ER.   Apparently patient was seen at [facility] then transferred to [facility]  At the ED, afebrile, hemodynamically stable.  THROMBOCYTOPENIA, INR slightly elevated.  CT head:  1.  Right holohemispheric subdural hematoma, appears similar to prior study given differences of technique.   2.  Left acute on chronic appearing left subdural hematoma, appears similar to prior study given differences of technique.   3.  Atherosclerosis.   CXR:    1.  Interstitial edema and/or infiltrates.   2.  Trace bilateral pleural effusions   3.  Cardiomegaly   4.  Atherosclerosis   Patient was put on antibiotics and completed the course. However the patients neurologic status is persistent. His scans show persisting cerebral edema " despite decadron and Keppra. 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 Neurosurgery will not perform any decompression surgery because of cirrhosis and high risk intracranial hemorrhage. He is not a candidate for liver transplant based on his age and debility (and the fact he has a brain bleed). Patient also seems agitated and pulls out lines and tubes thus he is not a good candidate for PEG tubes and overall not a good candidate for rehab. Palliative was consulted and after discussion it was decided that patient go home with hospice as patient desire is to go home.    At discharge date, Jesse Conway afebrile and hemodynamically stable.  Jesse Conway wanted to be discharged today.    Discharge Physical Exam  Vitals and nursing note reviewed. Exam conducted with a chaperone present.   Constitutional:       General: He is not in acute distress.     Appearance: He is underweight. He is toxic-appearing.      Interventions: Nasal cannula in place.      Comments: Feeding tube in place   HENT:      Head: Normocephalic.      Right Ear: Tympanic membrane and external ear normal.      Left Ear: Tympanic membrane and external ear normal.      Nose: Nose normal. No rhinorrhea.      Mouth/Throat:      Mouth: Mucous membranes are moist.      Pharynx: Oropharynx is clear.   Eyes:      Extraocular Movements: Extraocular movements intact.      Conjunctiva/sclera: Conjunctivae normal.      Pupils: Pupils are equal, round, and reactive to light.   Cardiovascular:      Rate and Rhythm: Normal rate and regular rhythm.      Heart sounds: No murmur heard.  Pulmonary:      Effort: Pulmonary effort is normal.      Breath sounds: Normal breath sounds.   Abdominal:      General: Abdomen is flat. Bowel sounds are normal.      Palpations: Abdomen is soft.      Tenderness: There is no guarding.      Hernia: No hernia is present.    Musculoskeletal:      Cervical back: Normal range of motion and neck supple. No rigidity.      Right lower leg: No edema.      Left lower leg: No edema.   Lymphadenopathy:      Cervical: No cervical adenopathy.   Skin:     General: Skin is warm and dry.      Capillary Refill: Capillary refill takes less than 2 seconds.   Neurological:      Mental Status: He is unresponsive.      GCS: GCS eye subscore is 1. GCS verbal subscore is 1. GCS motor subscore is 1.      Motor: Weakness present.      Comments: Patient does not withdraw to pain   Psychiatric:         Speech: He is noncommunicative.         Behavior: Behavior is uncooperative.         Cognition and Memory: Cognition is impaired. Memory is impaired.      Comments: Not agitated        Imaging  DX-ABDOMEN FOR TUBE PLACEMENT   Final Result         1.  Nonspecific bowel gas pattern.   2.  Dobbhoff tube tip terminates overlying the expected location of the gastric body.   3.  Interstitial pulmonary edema or infiltrates.   4.  Atherosclerosis      CT-HEAD W/O   Final Result         1.  Right holohemispheric subdural hematoma, appears similar to prior study given differences of technique.   2.  Left acute on chronic appearing left subdural hematoma, appears similar to prior study given differences of technique.   3.  Atherosclerosis.         DX-CHEST-PORTABLE (1 VIEW)   Final Result         1.  Interstitial edema and/or infiltrates.   2.  Trace bilateral pleural effusions   3.  Cardiomegaly   4.  Atherosclerosis                Therefore, he is discharged in guarded and stable condition to hospice.    The patient met 2-midnight criteria for an inpatient stay at the time of discharge.    Discharge Date  10/6/2022        DISCHARGE DIAGNOSES  Principal Problem:    Traumatic Subdural hematoma POA: Yes  Active Problems:    Thrombocytopenia (HCC) POA: Yes    AMS (altered mental status) POA: Yes    Hepatic coma (HCC) POA: Unknown    UTI (urinary tract infection) POA:  Unknown    Failure to thrive in adult POA: Unknown        FOLLOW UP  No future appointments.  [facility]  [id number]  [place] [address]  [phone]          MEDICATIONS ON DISCHARGE     Medication List        START taking these medications        Instructions   lactulose 20 GM/30ML Soln   45 mL by Enteral Tube route 3 times a day.  Dose: 45 mL     LORazepam 1 MG Tabs  Commonly known as: ATIVAN   Take 1 Tablet by mouth every 6 hours as needed for Anxiety for up to 3 days.  Dose: 1 mg     polyethylene glycol/lytes 17 g Pack  Commonly known as: MIRALAX   Take  by mouth 1 time a day as needed (if sennosides and docusate ineffective after 24 hours).     riFAXIMin 550 MG Tabs tablet  Commonly known as: XIFAXAN   1 Tablet by Enteral Tube route 2 times a day.  Dose: 550 mg     senna-docusate 8.6-50 MG Tabs  Commonly known as: PERICOLACE or SENOKOT S   Take 2 Tablets by mouth 2 times a day.  Dose: 2 Tablet            CHANGE how you take these medications        Instructions   levETIRAcetam 500 MG Tabs  What changed: when to take this  Commonly known as: KEPPRA   Take 1 Tablet by mouth 2 times a day.  Dose: 500 mg            CONTINUE taking these medications        Instructions   acetaminophen 325 MG Tabs  Commonly known as: Tylenol   Take 1 Tablet by mouth every four hours as needed for Mild Pain or Moderate Pain.  Dose: 325 mg     amLODIPine 5 MG Tabs  Commonly known as: NORVASC   Take 1 Tablet by mouth every day.  Dose: 5 mg     dexamethasone 2 MG tablet  Commonly known as: DECADRON   Take 1 Tablet by mouth every 8 hours for 9 days.  Dose: 2 mg     famotidine 20 MG Tabs  Commonly known as: PEPCID   Take 1 Tablet by mouth 2 times a day.  Dose: 20 mg     omeprazole 20 MG delayed-release capsule  Commonly known as: PRILOSEC   Take 1 Capsule by mouth every day for 14 days.  Dose: 20 mg              Allergies  Allergies   Allergen Reactions    Pcn [Penicillins] Rash and Nausea     .> 70 years ago        DIET  Orders Placed This Encounter   Procedures    Diet Order Diet: Level 5 - Minced and Moist (Eating despite risk. Meds crushed in applesauce.); Liquid level: Level 2 - Mildly Thick; Tray Modifications (optional): SLP - 1:1 Supervision by Nursing     Standing Status:   Standing     Number of Occurrences:   1     Order Specific Question:   Diet:     Answer:   Level 5 - Minced and Moist [24]     Comments:   Eating despite risk. Meds crushed in applesauce.     Order Specific Question:   Liquid level     Answer:   Level 2 - Mildly Thick     Order Specific Question:   Tray Modifications (optional)     Answer:   SLP - 1:1 Supervision by Nursing       ACTIVITY  Hospice    CONSULTATIONS  Palliative    PROCEDURES  CT-HEAD W/O    Result Date: 10/2/2022    10/2/2022 9:42 PM HISTORY/REASON FOR EXAM: Mental status change, unknown cause TECHNIQUE/EXAM DESCRIPTION:  CT of the head without contrast. Sequential axial images were obtained from the vertex to the skull base without contrast. Up to date radiation dose reduction adjustments have been utilized to meet ALARA standards for radiation dose reduction. COMPARISON: September 28, 2022 FINDINGS: There is mild diffuse parenchymal volume loss observed. Periventricular and subcortical white matter low-attenuation changes are seen, most commonly associated with small vessel ischemic disease. The ventricles are normal in caliber and configuration. No  space occupying lesions or areas of acute vascular territory infarctions are identified. 16.5 mm right holohemispheric hyperdense fluid collection is seen. 11 mm mixed density layering left holohemispheric subdural fluid collection is observed. The visualized paranasal sinuses and mastoid air cells are well aerated bilaterally. No depressed calvarial fractures are identified. The visualized globes and retrobulbar soft tissues appear within normal limits.  Atherosclerotic intracranial calcifications are seen.     1.  Right  holohemispheric subdural hematoma, appears similar to prior study given differences of technique. 2.  Left acute on chronic appearing left subdural hematoma, appears similar to prior study given differences of technique. 3.  Atherosclerosis.     CT-HEAD W/O    Result Date: 9/28/2022 9/28/2022 2:37 AM HISTORY/REASON FOR EXAM: Stroke, follow up; New neuro changes TECHNIQUE/EXAM DESCRIPTION:  CT of the head without contrast. Sequential axial images were obtained from the vertex to the skull base without contrast. Up to date radiation dose reduction adjustments have been utilized to meet ALARA standards for radiation dose reduction. COMPARISON: September 26, 2022 FINDINGS: There is mild diffuse parenchymal volume loss observed. Periventricular and subcortical white matter low-attenuation changes are seen, most commonly associated with small vessel ischemic disease. Partial effacement of the right ventricle is seen, similar  to prior study. No space occupying lesions or areas of acute vascular territory infarctions are identified. There is 15.3 mm right holohemispheric intermediate density subdural fluid collection identified. There is mixed density 10.9 mm left holohemispheric subdural fluid collection with dependent density. The visualized paranasal sinuses and mastoid air cells are well aerated bilaterally. No depressed calvarial fractures are identified. The visualized globes and retrobulbar soft tissues appear within normal limits.  Atherosclerotic intracranial calcifications are seen.     1.  Bilateral holohemispheric subdural hematomas, overall appears similar to prior study given differences of technique. 2.  Atherosclerosis.     CT-HEAD W/O    Result Date: 9/26/2022 9/26/2022 1:42 AM HISTORY/REASON FOR EXAM: Head trauma, mod-severe TECHNIQUE/EXAM DESCRIPTION:  CT of the head without contrast. Sequential axial images were obtained from the vertex to the skull base without contrast. Up to date radiation dose  reduction adjustments have been utilized to meet ALARA standards for radiation dose reduction. COMPARISON: September 25, 2022 FINDINGS: There is mild diffuse parenchymal volume loss observed. Periventricular and subcortical white matter low-attenuation changes are seen, most commonly associated with small vessel ischemic disease. Partial effacement of the right ventricle is seen, otherwise the ventricles are normal in caliber and configuration. No space occupying lesions or areas of acute vascular territory infarctions are identified.  Right holohemispheric hyperdense fluid collection is seen measuring 17 mm. Left mixed density extra-axial fluid collection is seen measuring 9.8 mm. The visualized paranasal sinuses and mastoid air cells are well aerated bilaterally. No depressed calvarial fractures are identified. The visualized globes and retrobulbar soft tissues appear within normal limits.  Atherosclerotic intracranial calcifications are seen.     1.  Right holohemispheric acute subdural hematoma, similar compared to prior study given difference of technique 2.  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 3.  Atherosclerosis.     CT-HEAD W/O    Result Date: 9/25/2022 9/25/2022 6:32 PM HISTORY/REASON FOR EXAM:  Head trauma, minor (Age >= 65y). Altered mental status TECHNIQUE/EXAM DESCRIPTION AND NUMBER OF VIEWS: CT of the head without contrast. The study was performed on a helical multidetector CT scanner. Contiguous 2.5 mm axial sections were obtained from the skull base through the vertex. Up to date radiation dose reduction adjustments have been utilized to meet ALARA standards for radiation dose reduction. COMPARISON:  None available FINDINGS: Hemispheric hyperdense right subdural hematoma measuring about 1.7 cm in maximal thickness with localized mass effect including partial sulcal and right lateral ventricle  effacement and 2 mm right to left midline shift. Mixed density left subdural hematoma with somewhat dependent hyperdense component. The subdural hematoma measures about 1.2 cm in thickness with mild localized mass effect. Basilar cisterns are patent. Patchy hypodensities in the cerebral white matter are nonspecific, most likely mild to moderate chronic microvascular ischemic changes. No depressed calvarial fracture. Paranasal sinuses and mastoids are clear.     1. Hyperdense hemispheric right subdural hematoma measuring 1.7 cm in thickness with localized mass effect and 2 mm leftward midline shift. 2. 1.2 cm thick mixed density left frontoparietal convexity subdural hematoma. 3. Chronic microvascular ischemic type changes. Based solely on CT findings, the brain injury guideline category is mBIG 3. EDH IVH Displaced skull fx SDH > 8mm IPH > 8mm or multiple SAH bi-hemispheric or > 3mm The original BIG retrospective analysis found radiographic progression in 0% of BIG 1 patients and 2.6% BIG 2. These findings were discussed with YAMILA LAURENT on 9/25/2022 6:57 PM.    DX-CHEST-PORTABLE (1 VIEW)    Result Date: 10/2/2022    10/2/2022 9:42 PM HISTORY/REASON FOR EXAM: Altered mental status TECHNIQUE/EXAM DESCRIPTION:  Single AP view of the chest. COMPARISON: September 25, 2022 FINDINGS: Cardiomegaly is observed. Atherosclerotic calcification of the aorta is noted.  Bilateral perihilar interstitial prominence and bronchial wall cuffing is noted. Bilateral lung volumes are diminished.  Hazy interstitial bilateral pulmonary opacities are seen. Blunting of bilateral costophrenic angles is seen, compatible with trace bilateral pleural effusions. The bony structures appear age-appropriate.     1.  Interstitial edema and/or infiltrates. 2.  Trace bilateral pleural effusions 3.  Cardiomegaly 4.  Atherosclerosis    DX-CHEST-PORTABLE (1 VIEW)    Result Date: 9/25/2022 9/25/2022 6:00 PM HISTORY/REASON FOR EXAM:  Shortness  of Breath. TECHNIQUE/EXAM DESCRIPTION AND NUMBER OF VIEWS: Single portable view of the chest. COMPARISON: 3/30/2018 FINDINGS: Heart is borderline enlarged. Mild interstitial prominence. No consolidation, pleural effusion or pneumothorax. Generalized osteopenia.     Mild interstitial prominence which is nonspecific but possibly represents vascular congestion.    US-RUQ    Result Date: 9/28/2022 9/28/2022 6:41 AM HISTORY/REASON FOR EXAM:  Abnormal Labs, Abdominal pain TECHNIQUE/EXAM DESCRIPTION AND NUMBER OF VIEWS:  Real-time sonography of the liver and biliary tree. COMPARISON: None FINDINGS: The liver is nodular in contour. There is no evidence of solid mass lesion. The liver measures 9.93 cm. The left hepatic lobe is not visualized limiting exam. The gallbladder is normal. There is no evidence of cholelithiasis.  The gallbladder wall thickness measures 2.80 mm. There is no pericholecystic fluid. The common duct measures 6.10 mm. The pancreas is obscured by bowel gas. The aorta is obscured by bowel gas. Intrahepatic IVC is patent. The portal vein is patent with sluggish hepatopetal flow. The MPV measures 0.89 cm cm. The right kidney measures 10.15 cm. Moderate quantity of right upper quadrant abdominal ascites is seen.     1.  Small nodular appearing liver, appearance compatible changes of cirrhosis 2.  Mild common bile duct dilatation, within physiologic limits for patient's reported age. 3.  Sluggish hepatopedal flow, likely related to portal hypertension. 4.  Moderate quantity of right upper quadrant abdominal ascites    DX-ABDOMEN FOR TUBE PLACEMENT    Result Date: 10/3/2022   10/3/2022 4:56 AM HISTORY/REASON FOR EXAM: NG tube placement verification TECHNIQUE/EXAM DESCRIPTION:  Single AP view the abdomen. COMPARISON:  None FINDINGS: Limited views of the lung bases demonstrate hazy interstitial pulmonary opacities. Atherosclerotic calcifications are seen. Nasogastric tube is seen, the tip lies to the left of  the lumbar spine.  The bowel gas pattern appears nonspecific. The bony structures appear age-appropriate.     1.  Nonspecific bowel gas pattern. 2.  Dobbhoff tube tip terminates overlying the expected location of the gastric body. 3.  Interstitial pulmonary edema or infiltrates. 4.  Atherosclerosis       LABORATORY  Lab Results   Component Value Date    SODIUM 141 10/04/2022    POTASSIUM 4.5 10/04/2022    CHLORIDE 110 10/04/2022    CO2 19 (L) 10/04/2022    GLUCOSE 206 (H) 10/04/2022    BUN 33 (H) 10/04/2022    CREATININE 0.73 10/04/2022    CREATININE 0.85 04/30/2010    GLOMRATE >59 04/30/2010        Lab Results   Component Value Date    WBC 12.8 (H) 10/04/2022    HEMOGLOBIN 12.8 (L) 10/04/2022    HEMATOCRIT 36.6 (L) 10/04/2022    PLATELETCT 68 (L) 10/04/2022        Total time of the discharge process exceeds 40 minutes.

## 2022-10-06 NOTE — CONSULTS
Reason for PC Consult: Advance Care Planning    Consulted by: Dr. Palacios    Assessment:  General: Pt is a 88 yo male presented 10/2 for change in mental status after 48 hours pt's family called 911. Encephalopathic with increased  ammonia level. In ER he was evaluated with repeat CT head without contrast that showed bilateral subdural hematomas that appear to be stable comparing to prior CT.  UA came back positive for many bacteria, WBC 10-20, leukocyte esterase large, patient started on ceftriaxone. Past medical history of recent admission for bilateral traumatic subdural hematoma 9/25-9/28, but neurosurgery consulted and decided that he is too high risk for surgery due to liver failure with thrombocytopenia,, clinically improved discharged on Decadron and prophylactic Keppra, history of dementia, GERD, hypertension, liver cirrhosis    Social: Pt lives locally with his daughter Kellen and her  who provides care for her father and occassionally his sons will help out. She does communicate with her brothers about his health care issues.     Consults: none this admit    Dyspnea: No-    Last BM: 10/04/22-    Pain: No-    Depression: Mood appropriate for situation-    Dementia: Yes;Encephalopathic      Spiritual:  Is Church or spirituality important for coping with this illness? Yes-    Has a  or spiritual provider visit been requested? No    Palliative Performance Scale: 40%    Advance Directive: None-    DPOA:  -  NOk Kellen listed as emergency contact  POLST: No-  yes with this admission. DNR/DNI comfort focused care no long term feeding tubes     Code Status: DNR-      Outcome:  Called Kellen and she was at bedside went to room of pt and spoke and Introduced self and role of Palliative care. Assessed pt's understanding of pt's current medical status, overall antione picture, and options for future care.  Kellen had a good understanding of her fathers medical history. She stated that he is slightly  improved can talk a bit but is primarily bed bound at home unable to get him to the shower and very difficult to get him to appointments. He has become weaker since his subdural hematoma which she believes resulted from a fall that was not witness by her. She stated that he has not had any alcohol for 10 yrs and has been bed bound for the last 6 months needing full time care. She understands that he is progressing and has a chronic health issues that will lead to his death.   Explored pt's values, beliefs and preferences in order to identify GOC with Kellen. She stated that her father hates hospitals and the goal is to keep him comfortable in the home setting. She would like to offer him food and drink and recommendations for such, understanding that he is having trouble swallowing and there is a risk for aspiration. She expressed that she would not want a PEG tube with the fear of infection and the risk he would pull it out. She is clear on his wishes to be a DNR/DNI. AFSHAN reviewed the POLST with Kellen and she agreed to DNR/DNI with comfort fouced treatment after understanding the philosophy of hospice care vs home health care.     AFSHAN explained in great detail the difference between home health and hospice care. She feels that Jesse would not be able to participate in therapies or would want to, and she does not want him to go back to the hospital for aggressive treatment for infections or hydration. Explained that Hospice agencies have 24 hour line for emergencies and explained the disciplines that will help provide care for her father in the home. Validated Kellen feelings expressing that is can be very confusing and wanting to know the right thing to do. She stated that her brothers felt like hospice is like giving up on a person, but she stated that she is the primary care giver and will explain the service and support so they understand. Choice for [facility] hosp[facility] made and hospice order placed already by   Suzanne. PC RN called the [facility] Liaison and office to express that family would like pt to go home today if at all possible. POLST form completed and placed on white board and Kellen will hang it on his bed in the home.     Active listening, reflection, reminiscing, validation and normalization and empathic support provided throughout the encounter. All questions answered and PC contact information provided.     Updated: MD, RN and RNCM    Plan: [facility] hospice choice, hopefully to go home today per wishes of family. POLST completed     Thank you for allowing Palliative Care to participate in this patient's care. Please feel free to call [phone] with any questions or concerns.

## 2022-10-06 NOTE — THERAPY
"Speech Language Pathology   Clinical Swallow Evaluation     Patient Name: Jesse Conway  AGE:  87 y.o., SEX:  male  Medical Record #: [id number]  Today's Date: 10/6/2022     Precautions  Precautions: (P) Fall Risk, Swallow Precautions ( See Comments)    HPI: Pt is a 87 y.o. male with past medical history of recent admission for bilateral traumatic subdural hematoma 9/25-9/28. Pt presented 10/2/2022 with worsening of mental status. According to her daughter, he was sleeping all the day, refusing food and has been becoming more and more unresponsive. CSE 9/27: MM5/TN0 w/ 1:1    CMHx: Traumatic SDH, AMS, hepatic coma, UTI, failure to thrive in adult  PMHx: HTN, GERD, MDD, CAD, dementia w/ sundowning, liver cirrhosis    CT Head w/o 10/2:  \"1.  Right holohemispheric subdural hematoma, appears similar to prior study given differences of technique.  2.  Left acute on chronic appearing left subdural hematoma, appears similar to prior study given differences of technique.  3.  Atherosclerosis.\"    CXR 10/2  \"1.  Interstitial edema and/or infiltrates.  2.  Trace bilateral pleural effusions  3.  Cardiomegaly  4.  Atherosclerosis\"    Level of Consciousness: Awake  Affect/Behavior: Calm  Follows Directives:  Inconsistent, simple commands only  Orientation: Self, General place  Hearing: Functional hearing  Vision: Functional vision      Prior Living Situation & Level of Function:  24-hour care giving w/ family. Swallow and communication impaired prior to hospitalization.       Oral Mechanism Evaluation  Facial Symmetry:  Equal, generally weak  Facial Sensation: Pt did not follow commands to assess  Labial Observations: Open mouth posture, Bilateral weakness  Lingual Observations: Midline, Poor protrusion, fair lateralization w/ PO  Dentition: Edentulous  Comments:      Voice  Quality: WFL  Resonance: WFL  Intensity: Appropriate  Cough:  Did not follow commands for cued cough, spontaneous cough WFL and " "prolonged  Comments:      Current Method of Nutrition   NPO pending SLP this hospitalization.      Subjective  Pt calm and somewhat cooperative w/ SLP evaluation tasks. Daughter at bedside to translate from Pakistani as needed. Daughter reports that before his hospitalization 9/27 he ate RG7/TN0. Since d/c, she reports giving him \"softer foods\" w/ TN0. She reports that, since his labs have changed and disorientation increased, he has been consistently coughing with water, and the family has no longer been giving him things to drink for fear of cough/choke.  Daughter reports that her goal for the swallowing evaluation is to find a way for him to eat or drink without coughing or choking on PO to maximize comfort. Per daughter and EMR, a feeding tube is against GOC.        Assessment  Positioning: Bed - Chair Position  Bolus Administration: SLP  Oxygen Requirements: Room Air  Factor(s) Affecting Performance: Impaired endurance, Impaired mental status, Impaired command following    Swallowing Trials  Ice: WFL  Thin Liquid (TN0): Impaired  Mildly Thick Liquid (MT2): WFL at bedside. CAN NOT R/O SILENT ASPIRATION  Liquidised (LQ3): WFL  Pureed (PU4): WFL  Minced & Moist (MM5): Impaired    Comments:  Pt w/ fair oral acceptance throughout trials. Fair oral bolus stripping, no anterior spillage. Diffuse residue in oral cavity following trials of PU4 and MM5, suspect component of fatigue. Cleared w/ cues for second swallow and liquid wash. Immediate and prolonged cough noted w/ single sip thin, concerning for airway invasion. Following s/sx of aspiration, SLP discussed MBSS vs. eating despite risk w/ pt's daughter. Pt's daughter and palliative RN at bedside reported hospice decision and goal to prevent coughing/choking when eating despite risk in order to maximize comfort. SLP then provided trials of MT2. No overt s/sx of aspiration or discomfort w/ single and sequential sips via straw. CANNOT R/O SILENT ASPIRATION. Inconsistent " labial seal for straw use, suspect component of fatigue.     SLP provided education to pt's daughter concerning IDDSI levels, thickening beverages, eating despite risk, and resources for modifying diet at home. IDDSI level 5 and 4 handouts provided, thickener packets provided. Concern for silent aspiration and risk of worsening status when eating despite risk reinforced. Pt's daughter verbalized understanding and agreement.        Clinical Impressions  Given GOC and target to maximize comfort while eating, recommend MM5/MT2. Pt's daughter given education and in agreement. SLP will complete orders at this time.        Recommendations  1.  Minced and moist w/ mildly thick liquids to promote comfort w/ PO given GOC  2.  Instrumentation:  Discussed eating despite risk vs. MBSS w/ pt's daughter who opted to eat despite risk given GOC.   3.  Swallowing Instructions & Precautions:   Supervision: 1:1 feeding with constant supervision  Positioning: Fully upright and midline during oral intake  Medication: Crush with applesauce or puree, as appropriate, Non Oral   Strategies: Small bites/sips, Alternate bites and sips, Slow rate of intake  Oral Care: Q4h  4.  SLP will complete orders at this time given education provided to family and tx not aligned w/ GOC and hospice decision.       Plan    Recommend Speech Therapy for Evaluation only given GOC for the following services:  Dysphagia Training and Patient / Family / Caregiver Education.    Discharge Recommendations: (P) Other (Family reports hospice decision. Tx not aligned w/ GOC, diet recommendation made to maxmize comfort.)       Objective   10/06/22 1130   Initial Contact Note    Initial Contact Note  Order Received and Verified, Evaluation Only - Patient Does Not Require Further Acute Speech Therapy at this Time.  However, May Benefit from Post Acute Therapy for Higher Level Functional Deficits.   Precautions   Precautions Fall Risk;Swallow Precautions ( See Comments)  "  Vitals   O2 Delivery Device None - Room Air   Prior Living Situation   Prior Services Continuous (24 Hour) Care Giving Family   Lives with - Patient's Self Care Capacity Spouse;Adult Children   Prior Level Of Function   Communication Impaired   Swallow Impaired   Patient's Primary Language Bengali   Patient / Family Goals   Patient / Family Goal #1 \"He has been coughing with water.\"   Short Term Goals   Short Term Goal # 1 Pt will consume diet of MM5/TN0 given 1:1 and precautions as posted w/o overt s/sx of aspiration or worsening of lung status.   Goal Outcome # 1 Goal not met   Short Term Goal # 2 Pt will consume diet of MM5/MT2 w/o s/sx of discomfort to maximize comfort w/ eating given GOC.   Education Group   Education Provided Dysphagia;Role of Speech Therapy   Dysphagia Patient Response Patient;Family;Acceptance;Explanation;Verbal Demonstration;Action Demonstration;Reinforcement Needed;Handout;Demonstration   Role of SLP Patient Response Patient;Family;Acceptance;Explanation;Verbal Demonstration;Reinforcement Needed   Additional Comments Pt w/ no learning evidence. Daughter w/ good verbal readback and handout provided for reinforcement.   Problem List   Problem List Dysphagia;Dysarthia;Dementia   Anticipated Discharge Needs   Discharge Recommendations Other  (Family reports hospice decision. Tx not aligned w/ GOC, diet recommendation made to [facility] comfort.)   Therapy Recommendations Upon DC Patient / Family / Caregiver Education   Interdisciplinary Plan of Care Collaboration   IDT Collaboration with  Nursing;Family / Caregiver;Palliative RN   Collaboration Comments RN updated, education provided to family for diet recommendations on hospice.     "

## 2022-10-06 NOTE — DISCHARGE PLANNING
Case Management Discharge Planning    Admission Date: 10/2/2022  GMLOS: 4.6  ALOS: 3    6-Clicks ADL Score:    6-Clicks Mobility Score:    PT and/or OT Eval ordered: No  Post-acute Referrals Ordered: Yes  Post-acute Choice Obtained: Yes  Has referral(s) been sent to post-acute provider:  Yes      Anticipated Discharge Dispo: Discharge Disposition: D/T to hospice home (50)  Discharge Address: [address] [place] [address]  Discharge Contact Phone Number: [phone]    DME Needed: No    Action(s) Taken: DC Assessment Complete (See below), Choice obtained, Referral(s) sent, Acceptance Received, and Transport Arranged     Escalations Completed: None    Medically Clear: Yes    Next Steps: Pt is going home w/[facility].    Barriers to Discharge: None    Awaiting on Ride Line to give time for transport.      Will inform hospice of time of transport so they can meet at house.

## 2022-10-06 NOTE — CARE PLAN
The patient is Stable - Low risk of patient condition declining or worsening    Shift Goals  Clinical Goals: Comfort; Safety  Patient Goals: AASHISH  Family Goals: Comfort and Updates    Progress made toward(s) clinical / shift goals:  Assumed care of pt at 1845. POC discussed with pt. Unable to assess patient's orientation. Pt nods inappropriately and says incomprehensible words. Pt on fall/seizure/aspiration precautions. Pt on Q4hr neuro checks and Q2hr turns. BMS in place and irrigated. Pt given Valium IV in the night, see MAR, for insomnia and restlessness. Pt educated to use the call light for assistance. Call light within reach. Pt rounded on frequently throughout the shift.      Problem: Pain - Standard  Goal: Alleviation of pain or a reduction in pain to the patient’s comfort goal  Outcome: Progressing  Note: Pt's pain assessed throughout shift. Patient offered pharmacological and non-pharmacological interventions.      Problem: Skin Integrity  Goal: Skin integrity is maintained or improved  Outcome: Progressing  Note:  Pt turned and repositioned Q2H or as requested. Barrier cream and mepilexes used to prevent skin breakdown on delicate perineal areas and bony prominences. Linen changes provided as needed to avoid risk of developing pressure ulcers.      Problem: Fall Risk  Goal: Patient will remain free from falls  Outcome: Progressing  Note: Fall precautions in place. Bed locked and in lowest position, bed alarm in place, treaded socks used when ambulated, call light within reach. Pt educated to call for assistance. Pt rounded on frequently throughout shift.         Patient is not progressing towards the following goals:

## 2022-10-06 NOTE — DISCHARGE PLANNING
DC Transport Scheduled    Received request at: 10/6/2022 1409    Transport Company Scheduled:  [employer]  Spoke with Winifred at [facility] to schedule transport.    Scheduled Date: 10/6/2022  Scheduled Time: 1545    Destination: Home    Notified care team of scheduled transport via Vo[employer].     If there are any changes needed to the DC transportation scheduled, please contact [facility] at ext. [phone] between the hours of 8748-2299 Mon-Fri. If outside those hours, contact the ED Case Manager at ext. [phone].

## 2022-10-06 NOTE — DISCHARGE PLANNING
Received Choice Form @: 1048  Agency/ Facility Name: [facility]  Referral Sent per Choice Form @: [phone]    Agency/Facility Name: [facility]  Spoke To:   Outcome: DPA notified Pt was accepted @ [facility] and RN CM to set up transport

## 2022-10-06 NOTE — PROGRESS NOTES
"Hospital Medicine Daily Progress Note    Date of Service  10/5/2022    Chief Complaint  Jesse Conway is a 87 y.o. male admitted 10/2/2022 with altered mental status    Hospital Course  Mr. Molina is an 87-year-old gentleman who was brought in at the request of family by ambulance because of altered mental status.  I had a opportunity to discuss with the daughter at bedside today his current situation and condition.  The patient apparently has not been arousable for probably about 48 hours.  He has been slumped over in the chair pretty unresponsive.  Upon coming into the hospital the patient continues to be very encephalopathic with a GCS of just 3.  His ammonia level is up and he has a holohemispheric subdural hematoma.  EEG was performed today which shows severe metabolic encephalopathy without any seizure activity.  Palliative care has been consulted.  Patient is DO NOT RESUSCITATE DO NOT INTUBATE.  Patient has underlying chronic alcoholic cirrhosis of the liver.  With ammonia levels being up we are going to try to give him lactulose to decrease the ammonia levels although it is doubtful that the patient's recovery will be significant.  I have discussed this with the daughter in detail.    Interval Problem Update  Bedside discussion with the daughter health regarding patient's very poor current overall status  Most likely patient will not be able to recover from this insult  Subdural hematomas holohemispheric and continues to be significant.  Ammonia level significantly elevated  Patient is GCS is only 3  Feeding tube in place  Most likely hospice candidate\"    10/4. Discussed with family  Plan for hospice at discharge. Not ready for comfort care yet.  10/5. No new issues or concerns awaiting hospice.    I have discussed this patient's plan of care and discharge plan at IDT rounds today with Case Management, Nursing, Nursing leadership, and other members of the IDT " team.    Consultants/Specialty  None    Code Status  DNAR/DNI  Hospice eval ordered and discussed  Disposition  Patient is not medically cleared for discharge.   Anticipate discharge to to hospice.  I have placed the appropriate orders for post-discharge needs.    Review of Systems  Review of Systems   Unable to perform ROS: Acuity of condition      Physical Exam  Temp:  [36.6 °C (97.9 °F)] 36.6 °C (97.9 °F)  Pulse:  [73-81] 81  Resp:  [18-19] 19  BP: (131-144)/(72-96) 144/89  SpO2:  [92 %-95 %] 92 %    Physical Exam  Vitals and nursing note reviewed. Exam conducted with a chaperone present.   Constitutional:       General: He is not in acute distress.     Appearance: He is underweight. He is toxic-appearing.      Interventions: Nasal cannula in place.      Comments: Feeding tube in place   HENT:      Head: Normocephalic.      Right Ear: Tympanic membrane and external ear normal.      Left Ear: Tympanic membrane and external ear normal.      Nose: Nose normal. No rhinorrhea.      Mouth/Throat:      Mouth: Mucous membranes are moist.      Pharynx: Oropharynx is clear.   Eyes:      Extraocular Movements: Extraocular movements intact.      Conjunctiva/sclera: Conjunctivae normal.      Pupils: Pupils are equal, round, and reactive to light.   Cardiovascular:      Rate and Rhythm: Normal rate and regular rhythm.      Heart sounds: No murmur heard.  Pulmonary:      Effort: Pulmonary effort is normal.      Breath sounds: Normal breath sounds.   Abdominal:      General: Abdomen is flat. Bowel sounds are normal.      Palpations: Abdomen is soft.      Tenderness: There is no guarding.      Hernia: No hernia is present.   Musculoskeletal:      Cervical back: Normal range of motion and neck supple. No rigidity.      Right lower leg: No edema.      Left lower leg: No edema.   Lymphadenopathy:      Cervical: No cervical adenopathy.   Skin:     General: Skin is warm and dry.      Capillary Refill: Capillary refill takes less than  2 seconds.   Neurological:      Mental Status: He is unresponsive.      GCS: GCS eye subscore is 1. GCS verbal subscore is 1. GCS motor subscore is 1.      Motor: Weakness present.      Comments: Patient does not withdraw to pain   Psychiatric:         Speech: He is noncommunicative.         Behavior: Behavior is uncooperative.         Cognition and Memory: Cognition is impaired. Memory is impaired.      Comments: Not agitated       Fluids    Intake/Output Summary (Last 24 hours) at 10/5/2022 1724  Last data filed at 10/5/2022 1500  Gross per 24 hour   Intake --   Output 400 ml   Net -400 ml         Laboratory  Recent Labs     10/02/22  2120 10/04/22  0220   WBC 5.3 12.8*   RBC 3.36* 3.65*   HEMOGLOBIN 11.9* 12.8*   HEMATOCRIT 33.9* 36.6*   .9* 100.3*   MCH 35.4* 35.1*   MCHC 35.1 35.0   RDW 60.2* 61.2*   PLATELETCT 67* 68*   MPV 11.7 12.4       Recent Labs     10/02/22  2120 10/04/22  0220   SODIUM 137 141   POTASSIUM 4.7 4.5   CHLORIDE 107 110   CO2 22 19*   GLUCOSE 142* 206*   BUN 24* 33*   CREATININE 0.62 0.73   CALCIUM 8.3* 8.7                     Imaging  DX-ABDOMEN FOR TUBE PLACEMENT   Final Result         1.  Nonspecific bowel gas pattern.   2.  Dobbhoff tube tip terminates overlying the expected location of the gastric body.   3.  Interstitial pulmonary edema or infiltrates.   4.  Atherosclerosis      CT-HEAD W/O   Final Result         1.  Right holohemispheric subdural hematoma, appears similar to prior study given differences of technique.   2.  Left acute on chronic appearing left subdural hematoma, appears similar to prior study given differences of technique.   3.  Atherosclerosis.         DX-CHEST-PORTABLE (1 VIEW)   Final Result         1.  Interstitial edema and/or infiltrates.   2.  Trace bilateral pleural effusions   3.  Cardiomegaly   4.  Atherosclerosis             Assessment/Plan  * Traumatic Subdural hematoma- (present on admission)  Assessment & Plan  Patient presents with  "holohemispheric subdural hematoma  Patient at this point is obtunded and has not been responding at home.  Patient will not respond to even painful stimuli  Seizure prevention with Keppra continue with  Decadron continued for decreasing cerebral edema  Daughter and I had extensive discussion at bedside, she understood from previous hospitalization the patient would be able to just return to normal activity as before.  I explained to her that with the ongoing condition it is doubtful that the patient will resume normal activity as previous.  Patient's subdural hematoma has not worsened however his mental status and neurological status has definitely deteriorated.\"    Hospice upon discharge, DNR/DNI for now at the hospital  Hospice pending    Failure to thrive in adult  Assessment & Plan  Await palliative care consult  Doubtful that this patient will make a successful recovery given his multiple insults including cirrhosis and subdural hematoma\"    Supplements ordered    UTI (urinary tract infection)  Assessment & Plan  Pending blood and urine culture  IV Rocephin continue day #2    Hepatic coma (HCC)  Assessment & Plan  Patient has longstanding history of excessive alcohol abuse  Patient has chronic established cirrhosis that at this point is not reversible.  Minimal ascites.  Ammonia levels at this point are up to 236.  Oral lactulose per NG tube is being given lactulose per enema is also being given  Monitor mental status so far GCS is 3    AMS (altered mental status)- (present on admission)  Assessment & Plan  GCS is 3  Combination of subdural hematoma and hyperammonemia     Thrombocytopenia (HCC)- (present on admission)  Assessment & Plan  With chronic cirrhosis of the liver patient has thrombocytopenia  Currently no bleeding noted  Does have some petechia and bruising       VTE prophylaxis: SCDs/TEDs    I have performed a physical exam and reviewed and updated ROS and Plan today (10/5/2022). In review of " yesterday's note (10/4/2022), there are no changes except as documented above.

## 2022-10-23 ASSESSMENT — PAIN SCALES - PAIN ASSESSMENT IN ADVANCED DEMENTIA (PAINAD)
TOTALSCORE: 0
NEGVOCALIZATION: 1 - OCCASIONAL MOAN OR GROAN. LOW-LEVEL SPEECH WITH A NEGATIVE OR DISAPPROVING QUALITY.
FACIALEXPRESSION: 1 - SAD. FRIGHTENED. FROWN.
NEGVOCALIZATION: 0 - NONE.
CONSOLABILITY: 0 - NO NEED TO CONSOLE.
BODYLANGUAGE: 0 - RELAXED.
BODYLANGUAGE: 0 - RELAXED.
TOTALSCORE: 2
CONSOLABILITY: 0 - NO NEED TO CONSOLE.
FACIALEXPRESSION: 0 - SMILING OR INEXPRESSIVE.

## 2022-10-23 ASSESSMENT — SOCIAL DETERMINANTS OF HEALTH (SDOH)
ACTIVE STRESSOR - HEALTH CHANGES: 1
ACTIVE STRESSOR - HEALTH CHANGES: 1